# Patient Record
Sex: MALE | Race: WHITE | Employment: OTHER | URBAN - METROPOLITAN AREA
[De-identification: names, ages, dates, MRNs, and addresses within clinical notes are randomized per-mention and may not be internally consistent; named-entity substitution may affect disease eponyms.]

---

## 2017-02-28 ENCOUNTER — APPOINTMENT (OUTPATIENT)
Dept: GENERAL RADIOLOGY | Facility: HOSPITAL | Age: 82
DRG: 194 | End: 2017-02-28
Attending: EMERGENCY MEDICINE
Payer: MEDICARE

## 2017-02-28 ENCOUNTER — HOSPITAL ENCOUNTER (INPATIENT)
Facility: HOSPITAL | Age: 82
LOS: 3 days | Discharge: HOME HEALTH | DRG: 194 | End: 2017-03-03
Attending: EMERGENCY MEDICINE | Admitting: HOSPITALIST
Payer: MEDICARE

## 2017-02-28 DIAGNOSIS — R11.2 NAUSEA VOMITING AND DIARRHEA: ICD-10-CM

## 2017-02-28 DIAGNOSIS — J18.9 COMMUNITY ACQUIRED PNEUMONIA: Primary | ICD-10-CM

## 2017-02-28 DIAGNOSIS — E11.9 TYPE 2 DIABETES MELLITUS WITHOUT COMPLICATION, WITHOUT LONG-TERM CURRENT USE OF INSULIN (HCC): ICD-10-CM

## 2017-02-28 DIAGNOSIS — R19.7 NAUSEA VOMITING AND DIARRHEA: ICD-10-CM

## 2017-02-28 DIAGNOSIS — E87.1 HYPONATREMIA: ICD-10-CM

## 2017-02-28 DIAGNOSIS — E86.0 DEHYDRATION: ICD-10-CM

## 2017-02-28 LAB
ALBUMIN SERPL-MCNC: 3 G/DL (ref 3.5–4.8)
ALP LIVER SERPL-CCNC: 75 U/L (ref 45–117)
ALT SERPL-CCNC: 41 U/L (ref 17–63)
AST SERPL-CCNC: 32 U/L (ref 15–41)
BASOPHILS # BLD AUTO: 0.04 X10(3) UL (ref 0–0.1)
BASOPHILS NFR BLD AUTO: 0.4 %
BILIRUB SERPL-MCNC: 1.5 MG/DL (ref 0.1–2)
BILIRUB UR QL STRIP.AUTO: NEGATIVE
BUN BLD-MCNC: 17 MG/DL (ref 8–20)
CALCIUM BLD-MCNC: 9.1 MG/DL (ref 8.3–10.3)
CHLORIDE: 99 MMOL/L (ref 101–111)
CO2: 23 MMOL/L (ref 22–32)
CREAT BLD-MCNC: 1.13 MG/DL (ref 0.7–1.3)
EOSINOPHIL # BLD AUTO: 0.01 X10(3) UL (ref 0–0.3)
EOSINOPHIL NFR BLD AUTO: 0.1 %
ERYTHROCYTE [DISTWIDTH] IN BLOOD BY AUTOMATED COUNT: 13.4 % (ref 11.5–16)
EST. AVERAGE GLUCOSE BLD GHB EST-MCNC: 123 MG/DL (ref 68–126)
GLUCOSE BLD-MCNC: 127 MG/DL (ref 65–99)
GLUCOSE BLD-MCNC: 136 MG/DL (ref 65–99)
GLUCOSE BLD-MCNC: 153 MG/DL (ref 70–99)
GLUCOSE BLD-MCNC: 180 MG/DL (ref 65–99)
GLUCOSE UR STRIP.AUTO-MCNC: NEGATIVE MG/DL
HBA1C MFR BLD HPLC: 5.9 % (ref ?–5.7)
HCT VFR BLD AUTO: 41.7 % (ref 37–53)
HGB BLD-MCNC: 14.4 G/DL (ref 13–17)
IMMATURE GRANULOCYTE COUNT: 0.11 X10(3) UL (ref 0–1)
IMMATURE GRANULOCYTE RATIO %: 1 %
KETONES UR STRIP.AUTO-MCNC: 20 MG/DL
LEUKOCYTE ESTERASE UR QL STRIP.AUTO: NEGATIVE
LIPASE: 111 U/L (ref 73–393)
LYMPHOCYTES # BLD AUTO: 0.4 X10(3) UL (ref 0.9–4)
LYMPHOCYTES NFR BLD AUTO: 3.8 %
M PROTEIN MFR SERPL ELPH: 7.3 G/DL (ref 6.1–8.3)
MCH RBC QN AUTO: 30.8 PG (ref 27–33.2)
MCHC RBC AUTO-ENTMCNC: 34.5 G/DL (ref 31–37)
MCV RBC AUTO: 89.3 FL (ref 80–99)
MONOCYTES # BLD AUTO: 0.87 X10(3) UL (ref 0.1–0.6)
MONOCYTES NFR BLD AUTO: 8.2 %
NEUTROPHIL ABS PRELIM: 9.13 X10 (3) UL (ref 1.3–6.7)
NEUTROPHILS # BLD AUTO: 9.13 X10(3) UL (ref 1.3–6.7)
NEUTROPHILS NFR BLD AUTO: 86.5 %
NITRITE UR QL STRIP.AUTO: NEGATIVE
PH UR STRIP.AUTO: 5 [PH] (ref 4.5–8)
PLATELET # BLD AUTO: 118 10(3)UL (ref 150–450)
POTASSIUM SERPL-SCNC: 3.7 MMOL/L (ref 3.6–5.1)
PROCALCITONIN SERPL-MCNC: 0.3 NG/ML (ref ?–0.11)
PROT UR STRIP.AUTO-MCNC: 100 MG/DL
RBC # BLD AUTO: 4.67 X10(6)UL (ref 3.8–5.8)
RBC UR QL AUTO: NEGATIVE
RED CELL DISTRIBUTION WIDTH-SD: 43.9 FL (ref 35.1–46.3)
RESPIRATORY PANEL NEG:: NEGATIVE
SODIUM SERPL-SCNC: 133 MMOL/L (ref 136–144)
SP GR UR STRIP.AUTO: 1.03 (ref 1–1.03)
UROBILINOGEN UR STRIP.AUTO-MCNC: <2 MG/DL
WBC # BLD AUTO: 10.6 X10(3) UL (ref 4–13)

## 2017-02-28 PROCEDURE — 99223 1ST HOSP IP/OBS HIGH 75: CPT | Performed by: HOSPITALIST

## 2017-02-28 PROCEDURE — 71010 XR CHEST AP PORTABLE  (CPT=71010): CPT

## 2017-02-28 RX ORDER — ONDANSETRON 2 MG/ML
4 INJECTION INTRAMUSCULAR; INTRAVENOUS ONCE
Status: COMPLETED | OUTPATIENT
Start: 2017-02-28 | End: 2017-02-28

## 2017-02-28 RX ORDER — ONDANSETRON 2 MG/ML
4 INJECTION INTRAMUSCULAR; INTRAVENOUS EVERY 4 HOURS PRN
Status: DISCONTINUED | OUTPATIENT
Start: 2017-02-28 | End: 2017-03-03

## 2017-02-28 RX ORDER — SODIUM CHLORIDE 9 MG/ML
INJECTION, SOLUTION INTRAVENOUS CONTINUOUS
Status: ACTIVE | OUTPATIENT
Start: 2017-02-28 | End: 2017-02-28

## 2017-02-28 RX ORDER — DEXTROSE MONOHYDRATE 25 G/50ML
50 INJECTION, SOLUTION INTRAVENOUS
Status: DISCONTINUED | OUTPATIENT
Start: 2017-02-28 | End: 2017-03-03

## 2017-02-28 RX ORDER — ENOXAPARIN SODIUM 100 MG/ML
40 INJECTION SUBCUTANEOUS DAILY
Status: DISCONTINUED | OUTPATIENT
Start: 2017-02-28 | End: 2017-02-28 | Stop reason: DRUGHIGH

## 2017-02-28 RX ORDER — SODIUM CHLORIDE 9 MG/ML
INJECTION, SOLUTION INTRAVENOUS ONCE
Status: COMPLETED | OUTPATIENT
Start: 2017-02-28 | End: 2017-02-28

## 2017-02-28 RX ORDER — ENOXAPARIN SODIUM 100 MG/ML
40 INJECTION SUBCUTANEOUS DAILY
Status: DISCONTINUED | OUTPATIENT
Start: 2017-02-28 | End: 2017-03-03

## 2017-02-28 RX ORDER — VIT A/VIT C/VIT E/ZINC/COPPER 2148-113
1 TABLET ORAL 2 TIMES DAILY
COMMUNITY

## 2017-02-28 RX ORDER — DOXEPIN HYDROCHLORIDE 50 MG/1
1 CAPSULE ORAL DAILY
COMMUNITY
End: 2020-10-29 | Stop reason: ALTCHOICE

## 2017-02-28 RX ORDER — ACETAMINOPHEN 325 MG/1
650 TABLET ORAL EVERY 6 HOURS PRN
Status: DISCONTINUED | OUTPATIENT
Start: 2017-02-28 | End: 2017-03-03

## 2017-02-28 RX ORDER — POTASSIUM CHLORIDE 20 MEQ/1
40 TABLET, EXTENDED RELEASE ORAL ONCE
Status: COMPLETED | OUTPATIENT
Start: 2017-02-28 | End: 2017-02-28

## 2017-02-28 RX ORDER — GABAPENTIN 400 MG/1
400 CAPSULE ORAL 2 TIMES DAILY
Status: DISCONTINUED | OUTPATIENT
Start: 2017-02-28 | End: 2017-03-03

## 2017-02-28 NOTE — ED INITIAL ASSESSMENT (HPI)
Pt arrived from [facility], called for vomiting/diarrhea for past couple days. +weakness.

## 2017-02-28 NOTE — H&P
АЛЕКСАНДР HOSPITALIST  History and Physical     Autumn Woodward Patient Status:  Inpatient    1933 MRN [id number]   [facility] 5NW-A Attending Moses Nova MD   Hosp Day # 0 PCP TITA HAYES [address] [place] [address]     Chief Complaint: Pneumonia    H Release Take 2 capsules by mouth. Disp:  Rfl:    MethylPREDNISolone 4 MG Oral Tablet Therapy Pack Take as per packet instructions Disp: 1 Package Rfl: 0   Fluticasone Propionate 50 MCG/ACT Nasal Suspension 2 sprays by Each Nare route daily.  Disp: 1 Bottle the last 72 hours. No results for input(s): TROP, CK in the last 72 hours. Imaging: Imaging data reviewed in Epic. ASSESSMENT / PLAN:     1. Community acquired pneumonia:  IV abx, follow cultures. 2. Diarrhea:  Check stool for c.  Diff, culture

## 2017-02-28 NOTE — ED PROVIDER NOTES
Patient Seen in: [facility] Emergency Department    History   Patient presents with:  Nausea/Vomiting/Diarrhea (gastrointestinal)    Stated Complaint: nvd    HPI    27-year-old male with a history of type 2 diabetes, history of hypercholesterolemia, elements reviewed from today and agreed except as otherwise stated in HPI.     Physical Exam       ED Triage Vitals   BP 02/28/17 0701 138/87 mmHg   Pulse 02/28/17 0701 87   Resp 02/28/17 0701 18   Temp 02/28/17 0701 99.8 °F (37.7 °C)   Temp src 02/28/17 07 9.13 (*)     Lymphocyte Absolute 0.40 (*)     Monocyte Absolute 0.87 (*)     All other components within normal limits   LIPASE - Normal   CBC WITH DIFFERENTIAL WITH PLATELET    Narrative:      The following orders were created for panel order CBC WITH DIFF department. Patient remained stable throughout the emergency department observation period. Patient was started on community-acquired coverage for pneumonia.   Although his numbers do not show acute renal failure, clinically patient appears dehydrated ove

## 2017-03-01 LAB
GLUCOSE BLD-MCNC: 111 MG/DL (ref 65–99)
GLUCOSE BLD-MCNC: 115 MG/DL (ref 65–99)
GLUCOSE BLD-MCNC: 123 MG/DL (ref 65–99)
GLUCOSE BLD-MCNC: 130 MG/DL (ref 65–99)
POTASSIUM SERPL-SCNC: 3.7 MMOL/L (ref 3.6–5.1)

## 2017-03-01 PROCEDURE — 99232 SBSQ HOSP IP/OBS MODERATE 35: CPT | Performed by: HOSPITALIST

## 2017-03-01 RX ORDER — POTASSIUM CHLORIDE 20 MEQ/1
40 TABLET, EXTENDED RELEASE ORAL ONCE
Status: COMPLETED | OUTPATIENT
Start: 2017-03-01 | End: 2017-03-01

## 2017-03-01 NOTE — HOME CARE LIAISON
Received referral for [facility] on d/c for SN/PT. Met with patient who is agreeable to [facility] on d/c. Agency brochure given to patient. Referral sent to [facility] via [facility]    Thank you for this referral,   Oswaldo Hopkins

## 2017-03-01 NOTE — PLAN OF CARE
Altered Communication/Language Barrier    • Patient/Family is able to understand and participate in their care Progressing        DISCHARGE PLANNING    • Discharge to home or other facility with appropriate resources Progressing        Diabetes/Glucose Con

## 2017-03-01 NOTE — PHYSICAL THERAPY NOTE
PHYSICAL THERAPY EVALUATION - INPATIENT     Room Number: 410/818-C  Evaluation Date: 3/1/2017  Type of Evaluation: Initial  Physician Order: PT Eval and Treat    Presenting Problem: Community acquired pneumonia  Reason for Therapy: Mobility Dysfunction little tight,\" she expresses concerns about his mobility within the living space. SUBJECTIVE  \"I normally walk faster than this. \"    Patient self-stated goal is go home.      OBJECTIVE  Precautions: Low vision  Fall Risk: Standard fall risk    WEIGHT Rolling walker  Pattern: Shuffle  Stoop/Curb Assistance: Not tested       Skilled Therapy Provided: Patient was met sitting up in bed, agreeable to PT.  Patient performed supine to sit transfer independently, performed sit to stand transfer with CGA after a mobility concerns within the home. DISCHARGE RECOMMENDATIONS  PT Discharge Recommendations: Home with home health PT    PLAN  PT Treatment Plan: Bed mobility; Body mechanics; Endurance; Energy conservation;Patient education; Family education;Gait training;

## 2017-03-01 NOTE — CM/SW NOTE
03/01/17 1600   CM/SW Referral Data   Referral Source Physician   Reason for Referral Discharge planning;Psychoscial assessment   Informant Patient   Pertinent Medical Hx   Primary Care Physician Name Byronbeau   Patient Info   Patient's Mental Status Aler

## 2017-03-01 NOTE — PROGRESS NOTES
АЛЕКСАНДР HOSPITALIST  Progress Note     Arpan Parson Patient Status:  Inpatient    1933 MRN [id number]   [facility] 5NW-A Attending Rachel Swenson MD   Hosp Day # 1 PCP TITA IBARRA     Chief Complaint: Dyspnea    S: Patient f eval    Quality:  · DVT Prophylaxis: Lovenox  · CODE status: Full Code  · [place]: None  · Central line: None    Estimated date of discharge: TBD  Discharge is dependent on: Progress  At this point Mr. Amilcar Dixon is expected to be discharge to: Home    Plan of ca

## 2017-03-02 ENCOUNTER — APPOINTMENT (OUTPATIENT)
Dept: CV DIAGNOSTICS | Facility: HOSPITAL | Age: 82
DRG: 194 | End: 2017-03-02
Attending: NURSE PRACTITIONER
Payer: MEDICARE

## 2017-03-02 ENCOUNTER — APPOINTMENT (OUTPATIENT)
Dept: GENERAL RADIOLOGY | Facility: HOSPITAL | Age: 82
DRG: 194 | End: 2017-03-02
Attending: INTERNAL MEDICINE
Payer: MEDICARE

## 2017-03-02 LAB
ATRIAL RATE: 69 BPM
BASOPHILS # BLD AUTO: 0.03 X10(3) UL (ref 0–0.1)
BASOPHILS NFR BLD AUTO: 0.5 %
BETA NATRIURETIC PEPTIDE: 32 PG/ML (ref 2–99)
BUN BLD-MCNC: 19 MG/DL (ref 8–20)
CALCIUM BLD-MCNC: 8.6 MG/DL (ref 8.3–10.3)
CHLORIDE: 101 MMOL/L (ref 101–111)
CO2: 26 MMOL/L (ref 22–32)
CREAT BLD-MCNC: 0.99 MG/DL (ref 0.7–1.3)
EOSINOPHIL # BLD AUTO: 0.2 X10(3) UL (ref 0–0.3)
EOSINOPHIL NFR BLD AUTO: 3.1 %
ERYTHROCYTE [DISTWIDTH] IN BLOOD BY AUTOMATED COUNT: 13.8 % (ref 11.5–16)
GLUCOSE BLD-MCNC: 115 MG/DL (ref 70–99)
GLUCOSE BLD-MCNC: 129 MG/DL (ref 65–99)
GLUCOSE BLD-MCNC: 142 MG/DL (ref 65–99)
GLUCOSE BLD-MCNC: 143 MG/DL (ref 65–99)
GLUCOSE BLD-MCNC: 164 MG/DL (ref 65–99)
HCT VFR BLD AUTO: 39.3 % (ref 37–53)
HGB BLD-MCNC: 13.3 G/DL (ref 13–17)
IMMATURE GRANULOCYTE COUNT: 0.25 X10(3) UL (ref 0–1)
IMMATURE GRANULOCYTE RATIO %: 3.9 %
LYMPHOCYTES # BLD AUTO: 0.58 X10(3) UL (ref 0.9–4)
LYMPHOCYTES NFR BLD AUTO: 9 %
MCH RBC QN AUTO: 30.8 PG (ref 27–33.2)
MCHC RBC AUTO-ENTMCNC: 33.8 G/DL (ref 31–37)
MCV RBC AUTO: 91 FL (ref 80–99)
MONOCYTES # BLD AUTO: 0.78 X10(3) UL (ref 0.1–0.6)
MONOCYTES NFR BLD AUTO: 12.1 %
NEUTROPHIL ABS PRELIM: 4.58 X10 (3) UL (ref 1.3–6.7)
NEUTROPHILS # BLD AUTO: 4.58 X10(3) UL (ref 1.3–6.7)
NEUTROPHILS NFR BLD AUTO: 71.4 %
P AXIS: 0 DEGREES
P-R INTERVAL: 176 MS
PLATELET # BLD AUTO: 131 10(3)UL (ref 150–450)
POTASSIUM SERPL-SCNC: 3.8 MMOL/L (ref 3.6–5.1)
Q-T INTERVAL: 384 MS
QRS DURATION: 74 MS
QTC CALCULATION (BEZET): 411 MS
R AXIS: -29 DEGREES
RBC # BLD AUTO: 4.32 X10(6)UL (ref 3.8–5.8)
RED CELL DISTRIBUTION WIDTH-SD: 46.5 FL (ref 35.1–46.3)
SODIUM SERPL-SCNC: 135 MMOL/L (ref 136–144)
T AXIS: 0 DEGREES
TROPONIN: <0.046 NG/ML (ref ?–0.05)
TROPONIN: <0.046 NG/ML (ref ?–0.05)
VENTRICULAR RATE: 69 BPM
WBC # BLD AUTO: 6.4 X10(3) UL (ref 4–13)

## 2017-03-02 PROCEDURE — 71020 XR CHEST PA + LAT CHEST (CPT=71020): CPT

## 2017-03-02 PROCEDURE — 99232 SBSQ HOSP IP/OBS MODERATE 35: CPT | Performed by: INTERNAL MEDICINE

## 2017-03-02 RX ORDER — GABAPENTIN 100 MG/1
100 CAPSULE ORAL NIGHTLY
COMMUNITY

## 2017-03-02 RX ORDER — POTASSIUM CHLORIDE 20 MEQ/1
40 TABLET, EXTENDED RELEASE ORAL ONCE
Status: COMPLETED | OUTPATIENT
Start: 2017-03-02 | End: 2017-03-02

## 2017-03-02 RX ORDER — AMOXICILLIN AND CLAVULANATE POTASSIUM 875; 125 MG/1; MG/1
1 TABLET, FILM COATED ORAL 2 TIMES DAILY
Qty: 14 TABLET | Refills: 0 | Status: SHIPPED | OUTPATIENT
Start: 2017-03-02 | End: 2017-03-03

## 2017-03-02 NOTE — CM/SW NOTE
Spoke with pt's RN who stated pt's dtr is requesting to speak with SW. Spoke with pt's dtr, Lela Malin ([phone]) who stated that pt's wife is current with [facility] ([phone]). She requested that pt use the same [facility] agency at [place].   Pt's dtr also

## 2017-03-02 NOTE — PROGRESS NOTES
АЛЕКСАНДР HOSPITALIST  Progress Note     Neal Gonzalez Patient Status:  Inpatient    1933 MRN [id number]   [facility] 5NW-A Attending Randall Aschoff, MD   Hosp Day # 2 PCP TITA IBARRA     Chief Complaint: Dyspnea    S: Patient f AST  32   --    --    ALT  41   --    --    BILT  1.5   --    --    TP  7.3   --    --        Estimated Creatinine Clearance: 58.4 mL/min (based on Cr of 0.99).          Imaging:   XR CHEST AP PORTABLE (CPT=71010)      TECHNIQUE:  AP chest radiograph was [facility]    Plan of care discussed with Patient.   Discussed with patient's daughter over phone  Karlos Dodd Daughter at  [phone]       Advise follow-up with regular primary care physician TITA HAYES [facility] within 1 week in office and patient jeff

## 2017-03-02 NOTE — PLAN OF CARE
GASTROINTESTINAL - ADULT    • Minimal or absence of nausea and vomiting Progressing    • Maintains or returns to baseline bowel function Progressing        Patient/Family Goals    • Patient/Family Long Term Goal Progressing    • Patient/Family Short Term G

## 2017-03-02 NOTE — PHYSICAL THERAPY NOTE
PHYSICAL THERAPY TREATMENT NOTE - INPATIENT    Room Number: 697/552-M     Session: 1   Number of Visits to Meet Established Goals: 5    Presenting Problem: Community acquired pneumonia    Problem List  Principal Problem:    Community acquired pneumonia  A wheelchair)?: A Little   -   Need to walk in hospital room?: A Little   -   Climbing 3-5 steps with a railing?: A Lot     AM-PAC Score:  Raw Score: 19   PT Approx Degree of Impairment Score: 41.77%   Standardized Score (AM-PAC Scale): 45.44   CMS Modifier address the above deficits to assist patient in returning to prior level of function. Patient would benefit from home health PT to address his reduced activity tolerance and mobility concerns within the home.      DISCHARGE RECOMMENDATIONS  PT Discharge Rec

## 2017-03-02 NOTE — CONSULTS
[facility] Cardiology Consultation 3/2/2017      Justyna Perez Patient Status:  Inpatient    1933 MRN [id number]   [facility] 5NW-A Attending Scarlet Mazariegos MD   Hosp Day # 2 PCP Kinsey Fisher     Initial Impression: file. He reports that he does not drink alcohol or use illicit drugs.     Medications:  • cefTRIAXone  2 g Intravenous Q24H   • insulin aspart  1-50 Units Subcutaneous TID CC and HS   • enoxaparin  40 mg Subcutaneous Daily   • gabapentin  400 mg Oral BID --   19   CREATSERUM  1.13   --   0.99   CA  9.1   --   8.6   GLU  153*   --   115*       Recent Labs   Lab  02/28/17   0703   ALT  41   AST  32   ALB  3.0*       No results for input(s): PTP, INR in the last 72 hours.     Recent Labs   Lab  03/02/17   05

## 2017-03-03 ENCOUNTER — APPOINTMENT (OUTPATIENT)
Dept: CV DIAGNOSTICS | Facility: HOSPITAL | Age: 82
DRG: 194 | End: 2017-03-03
Attending: NURSE PRACTITIONER
Payer: MEDICARE

## 2017-03-03 VITALS
HEIGHT: 70 IN | SYSTOLIC BLOOD PRESSURE: 107 MMHG | BODY MASS INDEX: 36.84 KG/M2 | HEART RATE: 67 BPM | WEIGHT: 257.31 LBS | RESPIRATION RATE: 16 BRPM | DIASTOLIC BLOOD PRESSURE: 70 MMHG | OXYGEN SATURATION: 92 % | TEMPERATURE: 98 F

## 2017-03-03 LAB
GLUCOSE BLD-MCNC: 110 MG/DL (ref 65–99)
GLUCOSE BLD-MCNC: 119 MG/DL (ref 65–99)
POTASSIUM SERPL-SCNC: 4.2 MMOL/L (ref 3.6–5.1)

## 2017-03-03 PROCEDURE — 93306 TTE W/DOPPLER COMPLETE: CPT | Performed by: INTERNAL MEDICINE

## 2017-03-03 PROCEDURE — 99238 HOSP IP/OBS DSCHRG MGMT 30/<: CPT | Performed by: INTERNAL MEDICINE

## 2017-03-03 PROCEDURE — 93306 TTE W/DOPPLER COMPLETE: CPT

## 2017-03-03 RX ORDER — AMOXICILLIN AND CLAVULANATE POTASSIUM 875; 125 MG/1; MG/1
1 TABLET, FILM COATED ORAL 2 TIMES DAILY
Qty: 14 TABLET | Refills: 0 | Status: SHIPPED | OUTPATIENT
Start: 2017-03-03 | End: 2017-03-10

## 2017-03-03 NOTE — DISCHARGE SUMMARY
[facility]  Discharge Summary    Autumn Woodward Patient Status:  Inpatient    1933 MRN [id number]   [facility] 5NW-A Attending No att. providers found   Hosp Day # 3 PCP TITA IBARRA     Date of Admission: 2017    Da clinically and discharged in stable condition    Procedures during hospitalization:   • none    Incidental or significant findings and recommendations (brief descriptions):  • none    Lab/Test results pending at Discharge:   · none      Discharge Medicatio known as:  Davon Flores                Where to Get Your Medications      These medications were sent to [facility] [address], [address], [phone], [phone]  [facility], [place] [address]

## 2017-03-03 NOTE — PROGRESS NOTES
NURSING DISCHARGE NOTE    Discharged Home via Wheelchair. Accompanied by Family member and Support staff  Belongings Taken by patient/family.     Discharge instructions reviewed with patient and daughter, instructed on new medication (Augmentin), instr

## 2017-03-03 NOTE — PROGRESS NOTES
АЛЕКСАНДР HOSPITALIST  Progress Note     Avelino Norman Patient Status:  Inpatient    1933 MRN [id number]   [facility] 5NW-A Attending Leticia Hurley MD   Hosp Day # 3 PCP TITA IBARRA     Chief Complaint: Dyspnea    S: Patient f CHEST AP PORTABLE (CPT=71010)      TECHNIQUE:  AP chest radiograph was obtained.      COMPARISON:  None.      INDICATIONS:  nvd      PATIENT STATED HISTORY:  Patient shares he lives in a senior home and has had vomiting and diarrhea for 4 days.           = regular primary care physician TITA Rivera within 1 week in office and patient verbalized understanding    Debra Marcial MD  3/3/2017

## 2017-03-03 NOTE — PLAN OF CARE
Altered Communication/Language Barrier    • Patient/Family is able to understand and participate in their care Completed        DISCHARGE PLANNING    • Discharge to home or other facility with appropriate resources Completed        Diabetes/Glucose Control

## 2017-03-06 NOTE — CM/SW NOTE
Patient discharged on 03/03/17 as previously planned.         03/06/17 0900   Discharge disposition   Discharged to: Home-Health   Name of Facillity/Home Care/Hospice ([facility])   Home services after discharge Skilled home care   Discharge transportat

## 2017-07-05 ENCOUNTER — APPOINTMENT (OUTPATIENT)
Dept: GENERAL RADIOLOGY | Facility: HOSPITAL | Age: 82
End: 2017-07-05
Attending: EMERGENCY MEDICINE
Payer: MEDICARE

## 2017-07-05 ENCOUNTER — HOSPITAL ENCOUNTER (EMERGENCY)
Facility: HOSPITAL | Age: 82
Discharge: HOME | End: 2017-07-05
Attending: EMERGENCY MEDICINE
Payer: MEDICARE

## 2017-07-05 VITALS
SYSTOLIC BLOOD PRESSURE: 122 MMHG | DIASTOLIC BLOOD PRESSURE: 58 MMHG | HEART RATE: 56 BPM | OXYGEN SATURATION: 96 % | RESPIRATION RATE: 16 BRPM | BODY MASS INDEX: 38.65 KG/M2 | HEIGHT: 70 IN | TEMPERATURE: 98 F | WEIGHT: 270 LBS

## 2017-07-05 DIAGNOSIS — K62.5 RECTAL BLEEDING: Primary | ICD-10-CM

## 2017-07-05 LAB
BASOPHILS # BLD AUTO: 0.07 X10(3) UL (ref 0–0.1)
BASOPHILS NFR BLD AUTO: 1.3 %
EOSINOPHIL # BLD AUTO: 0.28 X10(3) UL (ref 0–0.3)
EOSINOPHIL NFR BLD AUTO: 5.1 %
ERYTHROCYTE [DISTWIDTH] IN BLOOD BY AUTOMATED COUNT: 13.4 % (ref 11.5–16)
HCT VFR BLD AUTO: 46.6 % (ref 37–53)
HGB BLD-MCNC: 15.2 G/DL (ref 13–17)
IMMATURE GRANULOCYTE COUNT: 0.07 X10(3) UL (ref 0–1)
IMMATURE GRANULOCYTE RATIO %: 1.3 %
LYMPHOCYTES # BLD AUTO: 1.51 X10(3) UL (ref 0.9–4)
LYMPHOCYTES NFR BLD AUTO: 27.3 %
MCH RBC QN AUTO: 30 PG (ref 27–33.2)
MCHC RBC AUTO-ENTMCNC: 32.6 G/DL (ref 31–37)
MCV RBC AUTO: 92.1 FL (ref 80–99)
MONOCYTES # BLD AUTO: 0.41 X10(3) UL (ref 0.1–0.6)
MONOCYTES NFR BLD AUTO: 7.4 %
NEUTROPHIL ABS PRELIM: 3.19 X10 (3) UL (ref 1.3–6.7)
NEUTROPHILS # BLD AUTO: 3.19 X10(3) UL (ref 1.3–6.7)
NEUTROPHILS NFR BLD AUTO: 57.6 %
PLATELET # BLD AUTO: 130 10(3)UL (ref 150–450)
RBC # BLD AUTO: 5.06 X10(6)UL (ref 3.8–5.8)
RED CELL DISTRIBUTION WIDTH-SD: 45.3 FL (ref 35.1–46.3)
WBC # BLD AUTO: 5.5 X10(3) UL (ref 4–13)

## 2017-07-05 PROCEDURE — 99283 EMERGENCY DEPT VISIT LOW MDM: CPT

## 2017-07-05 PROCEDURE — 99284 EMERGENCY DEPT VISIT MOD MDM: CPT

## 2017-07-05 PROCEDURE — 36415 COLL VENOUS BLD VENIPUNCTURE: CPT

## 2017-07-05 PROCEDURE — 74000 XR ABDOMEN (KUB) (1 AP VIEW)  (CPT=74000): CPT | Performed by: EMERGENCY MEDICINE

## 2017-07-05 PROCEDURE — 82272 OCCULT BLD FECES 1-3 TESTS: CPT

## 2017-07-05 PROCEDURE — 85025 COMPLETE CBC W/AUTO DIFF WBC: CPT

## 2017-07-05 NOTE — ED PROVIDER NOTES
Patient Seen in: [facility] Emergency Department    History   Patient presents with:  Abdomen/Flank Pain (GI/)  GI Bleeding (gastrointestinal)    Stated Complaint: abd pain; gi bleed    HPI    70-year-old male presents with bright red blood per re Release,  Take 2 capsules by mouth. No family history on file. Smoking status: Former Smoker                                                              Packs/day: 0.00      Years: 0.00         Quit date: 1/1/1986  Alcohol use:  No PLATELET.   Procedure                               Abnormality         Status                     ---------                               -----------         ------                     CBC W/ DIFFERENTIAL[[id number]]          Abnormal            Final resul will return with worsening bleeding, weakness, or with any concerns. X-ray shows no evidence of retained metallic foreign body.         Disposition and Plan     Clinical Impression:  Rectal bleeding  (primary encounter diagnosis)    Disposition:  Discharge

## 2017-07-05 NOTE — ED INITIAL ASSESSMENT (HPI)
Pt believes he swallowed a metal part of his partial dentures about 2 weeks ago. Rectal bleeding, bright red, that started yesterday.

## 2017-10-01 ENCOUNTER — APPOINTMENT (OUTPATIENT)
Dept: CT IMAGING | Facility: HOSPITAL | Age: 82
End: 2017-10-01
Attending: EMERGENCY MEDICINE
Payer: MEDICARE

## 2017-10-01 ENCOUNTER — HOSPITAL ENCOUNTER (EMERGENCY)
Facility: HOSPITAL | Age: 82
Discharge: HOME | End: 2017-10-01
Attending: EMERGENCY MEDICINE
Payer: MEDICARE

## 2017-10-01 VITALS
RESPIRATION RATE: 19 BRPM | HEIGHT: 71 IN | DIASTOLIC BLOOD PRESSURE: 72 MMHG | OXYGEN SATURATION: 98 % | BODY MASS INDEX: 37.38 KG/M2 | TEMPERATURE: 98 F | SYSTOLIC BLOOD PRESSURE: 138 MMHG | HEART RATE: 59 BPM | WEIGHT: 267 LBS

## 2017-10-01 DIAGNOSIS — R11.2 NAUSEA AND VOMITING IN ADULT: ICD-10-CM

## 2017-10-01 DIAGNOSIS — R42 VERTIGO: Primary | ICD-10-CM

## 2017-10-01 PROCEDURE — 99285 EMERGENCY DEPT VISIT HI MDM: CPT

## 2017-10-01 PROCEDURE — 93005 ELECTROCARDIOGRAM TRACING: CPT

## 2017-10-01 PROCEDURE — 84484 ASSAY OF TROPONIN QUANT: CPT | Performed by: EMERGENCY MEDICINE

## 2017-10-01 PROCEDURE — 93010 ELECTROCARDIOGRAM REPORT: CPT

## 2017-10-01 PROCEDURE — 96374 THER/PROPH/DIAG INJ IV PUSH: CPT

## 2017-10-01 PROCEDURE — 85025 COMPLETE CBC W/AUTO DIFF WBC: CPT | Performed by: EMERGENCY MEDICINE

## 2017-10-01 PROCEDURE — 80053 COMPREHEN METABOLIC PANEL: CPT | Performed by: EMERGENCY MEDICINE

## 2017-10-01 PROCEDURE — 70450 CT HEAD/BRAIN W/O DYE: CPT | Performed by: EMERGENCY MEDICINE

## 2017-10-01 PROCEDURE — 96361 HYDRATE IV INFUSION ADD-ON: CPT

## 2017-10-01 RX ORDER — MECLIZINE HYDROCHLORIDE 25 MG/1
25 TABLET ORAL 3 TIMES DAILY PRN
Qty: 20 TABLET | Refills: 0 | Status: SHIPPED | OUTPATIENT
Start: 2017-10-01 | End: 2020-10-29 | Stop reason: ALTCHOICE

## 2017-10-01 RX ORDER — SODIUM CHLORIDE 9 MG/ML
125 INJECTION, SOLUTION INTRAVENOUS CONTINUOUS
Status: DISCONTINUED | OUTPATIENT
Start: 2017-10-01 | End: 2017-10-01

## 2017-10-01 RX ORDER — ONDANSETRON 4 MG/1
4 TABLET, ORALLY DISINTEGRATING ORAL EVERY 4 HOURS PRN
Qty: 10 TABLET | Refills: 0 | Status: SHIPPED | OUTPATIENT
Start: 2017-10-01 | End: 2017-10-08

## 2017-10-01 RX ORDER — MECLIZINE HYDROCHLORIDE 25 MG/1
25 TABLET ORAL ONCE
Status: COMPLETED | OUTPATIENT
Start: 2017-10-01 | End: 2017-10-01

## 2017-10-01 RX ORDER — ONDANSETRON 2 MG/ML
4 INJECTION INTRAMUSCULAR; INTRAVENOUS ONCE
Status: COMPLETED | OUTPATIENT
Start: 2017-10-01 | End: 2017-10-01

## 2017-10-01 NOTE — ED INITIAL ASSESSMENT (HPI)
Pt here via ems with nausea and vomiting. Pt stating he ate cabbage rolls at lunch time and felt ok. Vomited x 8 times . Denies abdominal pain. Feels weak with dizziness.

## 2017-10-01 NOTE — ED PROVIDER NOTES
Patient Seen in: [facility] Emergency Department    History   Patient presents with:  Nausea/Vomiting/Diarrhea (gastrointestinal)    Stated Complaint: Nausea vomiting    HPI    45-year-old male with a history of prostate and skin cancer, diabetes, h as otherwise stated in HPI.     Physical Exam   ED Triage Vitals [10/01/17 7594]  BP: 142/77  Pulse: 78  Resp: 14  Temp: 97.6 °F (36.4 °C)  Temp src: Temporal  SpO2: 96 %  O2 Device: None (Room air)    Current:/84   Pulse 58   Temp 97.6 °F (36.4 °C) ( ---------                               -----------         ------                     CBC W/ DIFFERENTIAL[[id number]]          Abnormal            Final result                 Please view results for these tests on the individual orders.    REYES GODFREY drawn.    The patient was administered normal saline infusion, Zofran medications for the purposes of treating  [nausea, vomiting, possible dehydration]. The patient had good response to these medications.     Patient continued to be observed here in the e

## 2017-10-01 NOTE — ED NOTES
Pt refuses antivert stating he no longer feels dizzy. Pt stating I feel better and I dosed off for a bit.  Family at bs

## 2018-01-12 ENCOUNTER — HOSPITAL ENCOUNTER (OUTPATIENT)
Facility: HOSPITAL | Age: 83
Setting detail: OBSERVATION
Discharge: HOME | End: 2018-01-13
Attending: EMERGENCY MEDICINE | Admitting: HOSPITALIST
Payer: MEDICARE

## 2018-01-12 ENCOUNTER — APPOINTMENT (OUTPATIENT)
Dept: GENERAL RADIOLOGY | Facility: HOSPITAL | Age: 83
End: 2018-01-12
Attending: EMERGENCY MEDICINE
Payer: MEDICARE

## 2018-01-12 DIAGNOSIS — R41.0 CONFUSION: Primary | ICD-10-CM

## 2018-01-12 LAB
ALBUMIN SERPL-MCNC: 3.5 G/DL (ref 3.5–4.8)
ALP LIVER SERPL-CCNC: 62 U/L (ref 45–117)
ALT SERPL-CCNC: 33 U/L (ref 17–63)
AST SERPL-CCNC: 26 U/L (ref 15–41)
BASOPHILS # BLD AUTO: 0.02 X10(3) UL (ref 0–0.1)
BASOPHILS NFR BLD AUTO: 0.4 %
BILIRUB SERPL-MCNC: 0.9 MG/DL (ref 0.1–2)
BUN BLD-MCNC: 13 MG/DL (ref 8–20)
CALCIUM BLD-MCNC: 8.9 MG/DL (ref 8.3–10.3)
CHLORIDE: 107 MMOL/L (ref 101–111)
CO2: 26 MMOL/L (ref 22–32)
CREAT BLD-MCNC: 1.11 MG/DL (ref 0.7–1.3)
EOSINOPHIL # BLD AUTO: 0.04 X10(3) UL (ref 0–0.3)
EOSINOPHIL NFR BLD AUTO: 0.8 %
ERYTHROCYTE [DISTWIDTH] IN BLOOD BY AUTOMATED COUNT: 13.5 % (ref 11.5–16)
GLUCOSE BLD-MCNC: 149 MG/DL (ref 70–99)
HCT VFR BLD AUTO: 43 % (ref 37–53)
HGB BLD-MCNC: 14.4 G/DL (ref 13–17)
IMMATURE GRANULOCYTE COUNT: 0.03 X10(3) UL (ref 0–1)
IMMATURE GRANULOCYTE RATIO %: 0.6 %
LACTIC ACID: 1.8 MMOL/L (ref 0.5–2)
LYMPHOCYTES # BLD AUTO: 0.3 X10(3) UL (ref 0.9–4)
LYMPHOCYTES NFR BLD AUTO: 6 %
M PROTEIN MFR SERPL ELPH: 6.9 G/DL (ref 6.1–8.3)
MCH RBC QN AUTO: 31 PG (ref 27–33.2)
MCHC RBC AUTO-ENTMCNC: 33.5 G/DL (ref 31–37)
MCV RBC AUTO: 92.7 FL (ref 80–99)
MONOCYTES # BLD AUTO: 0.46 X10(3) UL (ref 0.1–0.6)
MONOCYTES NFR BLD AUTO: 9.2 %
NEUTROPHIL ABS PRELIM: 4.13 X10 (3) UL (ref 1.3–6.7)
NEUTROPHILS # BLD AUTO: 4.13 X10(3) UL (ref 1.3–6.7)
NEUTROPHILS NFR BLD AUTO: 83 %
PLATELET # BLD AUTO: 91 10(3)UL (ref 150–450)
POTASSIUM SERPL-SCNC: 4.3 MMOL/L (ref 3.6–5.1)
RBC # BLD AUTO: 4.64 X10(6)UL (ref 3.8–5.8)
RED CELL DISTRIBUTION WIDTH-SD: 45.9 FL (ref 35.1–46.3)
SODIUM SERPL-SCNC: 140 MMOL/L (ref 136–144)
WBC # BLD AUTO: 5 X10(3) UL (ref 4–13)

## 2018-01-12 PROCEDURE — 71045 X-RAY EXAM CHEST 1 VIEW: CPT | Performed by: EMERGENCY MEDICINE

## 2018-01-13 VITALS
DIASTOLIC BLOOD PRESSURE: 75 MMHG | HEIGHT: 70 IN | HEART RATE: 72 BPM | TEMPERATURE: 99 F | OXYGEN SATURATION: 98 % | SYSTOLIC BLOOD PRESSURE: 123 MMHG | BODY MASS INDEX: 38.22 KG/M2 | RESPIRATION RATE: 18 BRPM | WEIGHT: 267 LBS

## 2018-01-13 PROBLEM — R41.0 CONFUSION: Status: ACTIVE | Noted: 2018-01-13

## 2018-01-13 LAB
EST. AVERAGE GLUCOSE BLD GHB EST-MCNC: 123 MG/DL (ref 68–126)
GLUCOSE BLD-MCNC: 104 MG/DL (ref 65–99)
GLUCOSE BLD-MCNC: 106 MG/DL (ref 65–99)
GLUCOSE BLD-MCNC: 109 MG/DL (ref 65–99)
HBA1C MFR BLD HPLC: 5.9 % (ref ?–5.7)
LACTIC ACID: 1.6 MMOL/L (ref 0.5–2)

## 2018-01-13 PROCEDURE — 99220 INITIAL OBSERVATION CARE,LEVL III: CPT | Performed by: HOSPITALIST

## 2018-01-13 RX ORDER — SODIUM PHOSPHATE, DIBASIC AND SODIUM PHOSPHATE, MONOBASIC 7; 19 G/133ML; G/133ML
1 ENEMA RECTAL ONCE AS NEEDED
Status: DISCONTINUED | OUTPATIENT
Start: 2018-01-13 | End: 2018-01-13

## 2018-01-13 RX ORDER — POLYETHYLENE GLYCOL 3350 17 G/17G
17 POWDER, FOR SOLUTION ORAL DAILY PRN
Status: DISCONTINUED | OUTPATIENT
Start: 2018-01-13 | End: 2018-01-13

## 2018-01-13 RX ORDER — SODIUM CHLORIDE 9 MG/ML
INJECTION, SOLUTION INTRAVENOUS CONTINUOUS
Status: DISCONTINUED | OUTPATIENT
Start: 2018-01-13 | End: 2018-01-13

## 2018-01-13 RX ORDER — ACETAMINOPHEN 325 MG/1
650 TABLET ORAL EVERY 6 HOURS PRN
Status: DISCONTINUED | OUTPATIENT
Start: 2018-01-13 | End: 2018-01-13

## 2018-01-13 RX ORDER — ONDANSETRON 2 MG/ML
4 INJECTION INTRAMUSCULAR; INTRAVENOUS EVERY 6 HOURS PRN
Status: DISCONTINUED | OUTPATIENT
Start: 2018-01-13 | End: 2018-01-13

## 2018-01-13 RX ORDER — DOCUSATE SODIUM 100 MG/1
100 CAPSULE, LIQUID FILLED ORAL 2 TIMES DAILY
Status: DISCONTINUED | OUTPATIENT
Start: 2018-01-13 | End: 2018-01-13

## 2018-01-13 RX ORDER — DEXTROSE MONOHYDRATE 25 G/50ML
50 INJECTION, SOLUTION INTRAVENOUS
Status: DISCONTINUED | OUTPATIENT
Start: 2018-01-13 | End: 2018-01-13

## 2018-01-13 RX ORDER — ENOXAPARIN SODIUM 100 MG/ML
40 INJECTION SUBCUTANEOUS DAILY
Status: DISCONTINUED | OUTPATIENT
Start: 2018-01-13 | End: 2018-01-13

## 2018-01-13 RX ORDER — GABAPENTIN 100 MG/1
100 CAPSULE ORAL NIGHTLY
Status: DISCONTINUED | OUTPATIENT
Start: 2018-01-13 | End: 2018-01-13

## 2018-01-13 RX ORDER — BISACODYL 10 MG
10 SUPPOSITORY, RECTAL RECTAL
Status: DISCONTINUED | OUTPATIENT
Start: 2018-01-13 | End: 2018-01-13

## 2018-01-13 NOTE — PROGRESS NOTES
Patient seen and examined. Medically clear to discharge today. . Influenza +ve. Pt with minimal symptoms now. Onset of symptoms about 1 week ago. Ok to DC.      Sal Hutchison MD

## 2018-01-13 NOTE — PLAN OF CARE
NURSING ADMISSION NOTE      Patient admitted via Cart  Oriented to room. Safety precautions initiated. Bed in low position. Call light in reach. Pt appears alert and oriented, able to participate in admission navigator fully.  No complaints of pa

## 2018-01-13 NOTE — PROGRESS NOTES
NURSING DISCHARGE NOTE    Discharged Home via Wheelchair. Accompanied by Family member  Belongings Taken by patient/family. Pt AOx3. Son at bedside. PT unable to see pt during shift, RN ambulated with pt and walker with standby asst. Gait steady.

## 2018-01-13 NOTE — H&P
АЛЕКСАНДР HOSPITALIST  History and Physical     [facility] Check Patient Status:  Observation    1933 MRN [id number]   [facility] 4NW-A Attending Marla Gonsales MD   Hosp Day # 0 PCP TITA HAYES [address] [place] [address]     Chief Complaint: fever, chil Disp:  Rfl:    multivitamin Oral Tab Take 1 tablet by mouth daily.  Disp:  Rfl:    MetFORMIN HCl 500 MG Oral Tab Take 500 mg by mouth daily with breakfast.   Disp:  Rfl:    Multiple Vitamins-Minerals (PRESERVISION AREDS) Oral Tab Take by mouth 2 (two) times 14.4   MCV  92.7   PLT  91.0*       Recent Labs   Lab  01/12/18   2257   GLU  149*   BUN  13   CREATSERUM  1.11   CA  8.9   ALB  3.5   NA  140   K  4.3   CL  107   CO2  26.0   ALKPHO  62   AST  26   ALT  33   BILT  0.9   TP  6.9       Estimated Creatinine

## 2018-01-13 NOTE — ED PROVIDER NOTES
Patient Seen in: [facility] Emergency Department    History   Patient presents with:  Fever (infectious)    Stated Complaint: Sepsis    HPI    Is an 70-year-old male coming with complaints of fever.   Since yesterday the patient's been in bed he has shift.  There is no tongue elevation and palatine tonsils show no purulent material or erythema.   No submandibular erythema and no tenderness along the sternocleidomastoid and no nuchal rigidity  Lungs are clear to auscultation bilaterally with no wheezes himself he will be admitted for further evaluation    Admission disposition: 1/13/2018 12:22 AM           Disposition and Plan     Clinical Impression:  Confusion  (primary encounter diagnosis)    Disposition:  Admit  1/13/2018 12:22 am    Follow-up:  No f

## 2018-01-13 NOTE — ED INITIAL ASSESSMENT (HPI)
Pt c/o flu like symptoms for past 3-days, however, tonight Pt became dizzy and weak and contacted his PCP, who advised PT to come to ER for further assessment and IV hydration

## 2018-01-22 ENCOUNTER — APPOINTMENT (OUTPATIENT)
Dept: GENERAL RADIOLOGY | Facility: HOSPITAL | Age: 83
End: 2018-01-22
Attending: EMERGENCY MEDICINE
Payer: MEDICARE

## 2018-01-22 ENCOUNTER — HOSPITAL ENCOUNTER (EMERGENCY)
Facility: HOSPITAL | Age: 83
Discharge: HOME | End: 2018-01-22
Attending: EMERGENCY MEDICINE
Payer: MEDICARE

## 2018-01-22 VITALS
DIASTOLIC BLOOD PRESSURE: 66 MMHG | HEART RATE: 71 BPM | SYSTOLIC BLOOD PRESSURE: 119 MMHG | BODY MASS INDEX: 38.22 KG/M2 | TEMPERATURE: 98 F | OXYGEN SATURATION: 95 % | WEIGHT: 267 LBS | HEIGHT: 70 IN | RESPIRATION RATE: 18 BRPM

## 2018-01-22 DIAGNOSIS — M10.072 ACUTE IDIOPATHIC GOUT OF LEFT ANKLE: ICD-10-CM

## 2018-01-22 DIAGNOSIS — B34.9 VIRAL SYNDROME: Primary | ICD-10-CM

## 2018-01-22 LAB
BASOPHILS # BLD AUTO: 0.05 X10(3) UL (ref 0–0.1)
BASOPHILS NFR BLD AUTO: 0.7 %
BUN BLD-MCNC: 13 MG/DL (ref 8–20)
CALCIUM BLD-MCNC: 8.9 MG/DL (ref 8.3–10.3)
CHLORIDE: 106 MMOL/L (ref 101–111)
CO2: 27 MMOL/L (ref 22–32)
CREAT BLD-MCNC: 1.08 MG/DL (ref 0.7–1.3)
EOSINOPHIL # BLD AUTO: 0.21 X10(3) UL (ref 0–0.3)
EOSINOPHIL NFR BLD AUTO: 3 %
ERYTHROCYTE [DISTWIDTH] IN BLOOD BY AUTOMATED COUNT: 13.2 % (ref 11.5–16)
GLUCOSE BLD-MCNC: 144 MG/DL (ref 70–99)
HCT VFR BLD AUTO: 42.1 % (ref 37–53)
HGB BLD-MCNC: 14.2 G/DL (ref 13–17)
IMMATURE GRANULOCYTE COUNT: 0.26 X10(3) UL (ref 0–1)
IMMATURE GRANULOCYTE RATIO %: 3.8 %
LYMPHOCYTES # BLD AUTO: 1.02 X10(3) UL (ref 0.9–4)
LYMPHOCYTES NFR BLD AUTO: 14.8 %
MCH RBC QN AUTO: 30 PG (ref 27–33.2)
MCHC RBC AUTO-ENTMCNC: 33.7 G/DL (ref 31–37)
MCV RBC AUTO: 88.8 FL (ref 80–99)
MONOCYTES # BLD AUTO: 0.6 X10(3) UL (ref 0.1–0.6)
MONOCYTES NFR BLD AUTO: 8.7 %
NEUTROPHIL ABS PRELIM: 4.76 X10 (3) UL (ref 1.3–6.7)
NEUTROPHILS # BLD AUTO: 4.76 X10(3) UL (ref 1.3–6.7)
NEUTROPHILS NFR BLD AUTO: 69 %
PLATELET # BLD AUTO: 211 10(3)UL (ref 150–450)
POTASSIUM SERPL-SCNC: 3.8 MMOL/L (ref 3.6–5.1)
RBC # BLD AUTO: 4.74 X10(6)UL (ref 3.8–5.8)
RED CELL DISTRIBUTION WIDTH-SD: 43 FL (ref 35.1–46.3)
SODIUM SERPL-SCNC: 141 MMOL/L (ref 136–144)
WBC # BLD AUTO: 6.9 X10(3) UL (ref 4–13)

## 2018-01-22 PROCEDURE — 71045 X-RAY EXAM CHEST 1 VIEW: CPT | Performed by: EMERGENCY MEDICINE

## 2018-01-22 PROCEDURE — 99284 EMERGENCY DEPT VISIT MOD MDM: CPT

## 2018-01-22 PROCEDURE — 85025 COMPLETE CBC W/AUTO DIFF WBC: CPT | Performed by: EMERGENCY MEDICINE

## 2018-01-22 PROCEDURE — 80048 BASIC METABOLIC PNL TOTAL CA: CPT | Performed by: EMERGENCY MEDICINE

## 2018-01-22 PROCEDURE — 36415 COLL VENOUS BLD VENIPUNCTURE: CPT

## 2018-01-22 RX ORDER — METHYLPREDNISOLONE 4 MG/1
TABLET ORAL
Qty: 1 PACKAGE | Refills: 0 | Status: SHIPPED | OUTPATIENT
Start: 2018-01-22 | End: 2018-01-27

## 2018-01-22 NOTE — ED NOTES
Patient's daughter at bedside, states patients left ankle has been painful and red. Patient with a history of gout, MD at bedside to assess ankle.

## 2018-01-22 NOTE — ED INITIAL ASSESSMENT (HPI)
Patient arrives via EMS for n/v/d that began over the weekend. Patient was treated in ED over the weekend and has been tolerating oral fluids well. Medics state patients HR was elevated around 100 en route.

## 2018-01-22 NOTE — ED PROVIDER NOTES
Patient Seen in: [facility] Emergency Department    History   Patient presents with:  Nausea/Vomiting/Diarrhea (gastrointestinal)    Stated Complaint: n/v/d    HPI    Friendly 80-year-old male here for evaluation of persistent upper respiratory symp air)    Current:/66   Pulse 71   Temp 98.3 °F (36.8 °C) (Oral)   Resp 18   Ht 177.8 cm (5' 10\")   Wt 121.1 kg   SpO2 95%   BMI 38.31 kg/m²         Physical Exam      Constitutional: Pt is oriented to person, place, and time.  Appears well-developed a some persistent upper respiratory symptoms. He was flu positive. Overall though it seems like he has had an upward trajectory. We will check basic labs and a chest x-ray given his persistent cough.   He is at risk for developing a bacterial superinfect

## 2018-02-11 ENCOUNTER — HOSPITAL ENCOUNTER (EMERGENCY)
Facility: HOSPITAL | Age: 83
Discharge: HOME | End: 2018-02-11
Attending: EMERGENCY MEDICINE
Payer: MEDICARE

## 2018-02-11 ENCOUNTER — APPOINTMENT (OUTPATIENT)
Dept: GENERAL RADIOLOGY | Facility: HOSPITAL | Age: 83
End: 2018-02-11
Attending: EMERGENCY MEDICINE
Payer: MEDICARE

## 2018-02-11 VITALS
WEIGHT: 276 LBS | TEMPERATURE: 98 F | BODY MASS INDEX: 39.51 KG/M2 | HEIGHT: 70 IN | HEART RATE: 61 BPM | DIASTOLIC BLOOD PRESSURE: 73 MMHG | OXYGEN SATURATION: 97 % | RESPIRATION RATE: 21 BRPM | SYSTOLIC BLOOD PRESSURE: 123 MMHG

## 2018-02-11 DIAGNOSIS — R11.0 NAUSEA: Primary | ICD-10-CM

## 2018-02-11 DIAGNOSIS — R42 DIZZINESS: ICD-10-CM

## 2018-02-11 LAB
ALBUMIN SERPL-MCNC: 3.2 G/DL (ref 3.5–4.8)
ALP LIVER SERPL-CCNC: 72 U/L (ref 45–117)
ALT SERPL-CCNC: 27 U/L (ref 17–63)
AST SERPL-CCNC: 25 U/L (ref 15–41)
ATRIAL RATE: 64 BPM
BASOPHILS # BLD AUTO: 0.03 X10(3) UL (ref 0–0.1)
BASOPHILS NFR BLD AUTO: 0.5 %
BILIRUB SERPL-MCNC: 1 MG/DL (ref 0.1–2)
BUN BLD-MCNC: 11 MG/DL (ref 8–20)
CALCIUM BLD-MCNC: 9.3 MG/DL (ref 8.3–10.3)
CHLORIDE: 107 MMOL/L (ref 101–111)
CO2: 25 MMOL/L (ref 22–32)
CREAT BLD-MCNC: 0.97 MG/DL (ref 0.7–1.3)
EOSINOPHIL # BLD AUTO: 0.24 X10(3) UL (ref 0–0.3)
EOSINOPHIL NFR BLD AUTO: 4.2 %
ERYTHROCYTE [DISTWIDTH] IN BLOOD BY AUTOMATED COUNT: 14.3 % (ref 11.5–16)
GLUCOSE BLD-MCNC: 121 MG/DL (ref 70–99)
HCT VFR BLD AUTO: 42.9 % (ref 37–53)
HGB BLD-MCNC: 14.1 G/DL (ref 13–17)
IMMATURE GRANULOCYTE COUNT: 0.04 X10(3) UL (ref 0–1)
IMMATURE GRANULOCYTE RATIO %: 0.7 %
LYMPHOCYTES # BLD AUTO: 1.03 X10(3) UL (ref 0.9–4)
LYMPHOCYTES NFR BLD AUTO: 17.9 %
M PROTEIN MFR SERPL ELPH: 6.9 G/DL (ref 6.1–8.3)
MCH RBC QN AUTO: 30.5 PG (ref 27–33.2)
MCHC RBC AUTO-ENTMCNC: 32.9 G/DL (ref 31–37)
MCV RBC AUTO: 92.7 FL (ref 80–99)
MONOCYTES # BLD AUTO: 0.47 X10(3) UL (ref 0.1–1)
MONOCYTES NFR BLD AUTO: 8.2 %
NEUTROPHIL ABS PRELIM: 3.95 X10 (3) UL (ref 1.3–6.7)
NEUTROPHILS # BLD AUTO: 3.95 X10(3) UL (ref 1.3–6.7)
NEUTROPHILS NFR BLD AUTO: 68.5 %
P AXIS: 27 DEGREES
P-R INTERVAL: 178 MS
PLATELET # BLD AUTO: 122 10(3)UL (ref 150–450)
POTASSIUM SERPL-SCNC: 4.3 MMOL/L (ref 3.6–5.1)
Q-T INTERVAL: 384 MS
QRS DURATION: 82 MS
QTC CALCULATION (BEZET): 396 MS
R AXIS: -39 DEGREES
RBC # BLD AUTO: 4.63 X10(6)UL (ref 3.8–5.8)
RED CELL DISTRIBUTION WIDTH-SD: 47.8 FL (ref 35.1–46.3)
SODIUM SERPL-SCNC: 140 MMOL/L (ref 136–144)
T AXIS: 30 DEGREES
TROPONIN: <0.046 NG/ML (ref ?–0.05)
VENTRICULAR RATE: 64 BPM
WBC # BLD AUTO: 5.8 X10(3) UL (ref 4–13)

## 2018-02-11 PROCEDURE — 93010 ELECTROCARDIOGRAM REPORT: CPT

## 2018-02-11 PROCEDURE — 96361 HYDRATE IV INFUSION ADD-ON: CPT

## 2018-02-11 PROCEDURE — 85025 COMPLETE CBC W/AUTO DIFF WBC: CPT | Performed by: EMERGENCY MEDICINE

## 2018-02-11 PROCEDURE — 71045 X-RAY EXAM CHEST 1 VIEW: CPT | Performed by: EMERGENCY MEDICINE

## 2018-02-11 PROCEDURE — 84484 ASSAY OF TROPONIN QUANT: CPT | Performed by: EMERGENCY MEDICINE

## 2018-02-11 PROCEDURE — 96374 THER/PROPH/DIAG INJ IV PUSH: CPT

## 2018-02-11 PROCEDURE — 93005 ELECTROCARDIOGRAM TRACING: CPT

## 2018-02-11 PROCEDURE — 99285 EMERGENCY DEPT VISIT HI MDM: CPT

## 2018-02-11 PROCEDURE — 80053 COMPREHEN METABOLIC PANEL: CPT | Performed by: EMERGENCY MEDICINE

## 2018-02-11 RX ORDER — ONDANSETRON 4 MG/1
4 TABLET, ORALLY DISINTEGRATING ORAL EVERY 4 HOURS PRN
Qty: 10 TABLET | Refills: 0 | Status: SHIPPED | OUTPATIENT
Start: 2018-02-11 | End: 2018-02-18

## 2018-02-11 RX ORDER — ONDANSETRON 2 MG/ML
4 INJECTION INTRAMUSCULAR; INTRAVENOUS ONCE
Status: DISCONTINUED | OUTPATIENT
Start: 2018-02-11 | End: 2018-02-11

## 2018-02-11 RX ORDER — SODIUM CHLORIDE 9 MG/ML
INJECTION, SOLUTION INTRAVENOUS ONCE
Status: COMPLETED | OUTPATIENT
Start: 2018-02-11 | End: 2018-02-11

## 2018-02-11 NOTE — ED INITIAL ASSESSMENT (HPI)
Woke with jaw this morning that is more with chewing. Sudden onset of dizziness, nausea at lunch today. Denies chest pain, sob, n/t, or weakness.

## 2018-02-11 NOTE — ED NOTES
Patient reports he is not nauseated at the time, does not need zofran. Patient will notify RN if status changes. Will continue to monitor.

## 2018-02-11 NOTE — ED PROVIDER NOTES
Patient Seen in: [facility] Emergency Department    History   Patient presents with:  Dizziness (neurologic)    Stated Complaint: vertigo    HPI    The patient presents with possible vertigo.   The patient states that he was eating a salad at lunch w %  O2 Device: None (Room air)    Current:/73   Pulse 61   Temp 98.1 °F (36.7 °C) (Temporal)   Resp 21   Ht 177.8 cm (5' 10\")   Wt 125.2 kg   SpO2 97%   BMI 39.60 kg/m²         Physical Exam    General: Alert and oriented x3 in no acute distress.   HE Rhythm  Reading: Left axis deviation, inferior Q waves-old in no acute ischemic change         Xr Chest Ap Portable  (cpt=71045)    Result Date: 2/11/2018  PROCEDURE:  XR CHEST AP PORTABLE (CPT=71045)  TECHNIQUE:  AP chest radiograph was obtained.   Zandra Ortiz Guero Hill MD  [address] [place] [id number] [facility]  [phone]              Medications Prescribed:  Discharge Medication List as of 2/11/2018  2:12 PM    START taking these medications    ondansetron 4 MG Oral Tablet Dispersible  Take 1 tablet (

## 2018-04-17 NOTE — PLAN OF CARE
Altered Communication/Language Barrier    • Patient/Family is able to understand and participate in their care Progressing        DISCHARGE PLANNING    • Discharge to home or other facility with appropriate resources Progressing        Diabetes/Glucose Con no

## 2018-08-14 ENCOUNTER — NURSE ONLY (OUTPATIENT)
Dept: LAB | Age: 83
End: 2018-08-14
Attending: UROLOGY
Payer: MEDICARE

## 2018-08-14 DIAGNOSIS — C61 MALIGNANT NEOPLASM OF PROSTATE (HCC): Primary | ICD-10-CM

## 2018-08-14 LAB — PSA SERPL-MCNC: 0.05 NG/ML (ref 0.01–4)

## 2018-08-14 PROCEDURE — 84153 ASSAY OF PSA TOTAL: CPT

## 2018-08-14 PROCEDURE — 36415 COLL VENOUS BLD VENIPUNCTURE: CPT

## 2018-10-17 ENCOUNTER — HOSPITAL ENCOUNTER (OUTPATIENT)
Dept: GENERAL RADIOLOGY | Facility: HOSPITAL | Age: 83
Discharge: HOME | End: 2018-10-17
Attending: FAMILY MEDICINE
Payer: MEDICARE

## 2018-10-17 DIAGNOSIS — E11.9 DM (DIABETES MELLITUS) (HCC): ICD-10-CM

## 2018-10-17 DIAGNOSIS — M54.50 LUMBAGO: ICD-10-CM

## 2018-10-17 DIAGNOSIS — R26.89 IMPAIRMENT OF BALANCE: ICD-10-CM

## 2018-10-17 DIAGNOSIS — Z98.890 HX OF SPINAL SURGERY: ICD-10-CM

## 2018-10-17 DIAGNOSIS — G62.9 NEUROPATHY: ICD-10-CM

## 2018-10-17 PROCEDURE — 72110 X-RAY EXAM L-2 SPINE 4/>VWS: CPT | Performed by: FAMILY MEDICINE

## 2018-10-17 PROCEDURE — 72072 X-RAY EXAM THORAC SPINE 3VWS: CPT | Performed by: FAMILY MEDICINE

## 2020-03-04 ENCOUNTER — HOSPITAL ENCOUNTER (OUTPATIENT)
Facility: HOSPITAL | Age: 85
Setting detail: OBSERVATION
Discharge: HOME HEALTH | End: 2020-03-10
Attending: EMERGENCY MEDICINE | Admitting: HOSPITALIST
Payer: MEDICARE

## 2020-03-04 DIAGNOSIS — N28.9 ACUTE RENAL INSUFFICIENCY: ICD-10-CM

## 2020-03-04 DIAGNOSIS — R19.7 DIARRHEA, UNSPECIFIED TYPE: ICD-10-CM

## 2020-03-04 DIAGNOSIS — T83.511A URINARY TRACT INFECTION ASSOCIATED WITH INDWELLING URETHRAL CATHETER, INITIAL ENCOUNTER (HCC): Primary | ICD-10-CM

## 2020-03-04 DIAGNOSIS — N39.0 URINARY TRACT INFECTION ASSOCIATED WITH INDWELLING URETHRAL CATHETER, INITIAL ENCOUNTER (HCC): Primary | ICD-10-CM

## 2020-03-04 RX ORDER — SODIUM CHLORIDE 9 MG/ML
INJECTION, SOLUTION INTRAVENOUS CONTINUOUS
Status: CANCELLED | OUTPATIENT
Start: 2020-03-04 | End: 2020-03-05

## 2020-03-05 ENCOUNTER — APPOINTMENT (OUTPATIENT)
Dept: CT IMAGING | Facility: HOSPITAL | Age: 85
End: 2020-03-05
Attending: HOSPITALIST
Payer: MEDICARE

## 2020-03-05 PROBLEM — T83.511A URINARY TRACT INFECTION ASSOCIATED WITH INDWELLING URETHRAL CATHETER, INITIAL ENCOUNTER (HCC): Status: ACTIVE | Noted: 2020-03-05

## 2020-03-05 PROBLEM — R19.7 DIARRHEA, UNSPECIFIED TYPE: Status: ACTIVE | Noted: 2020-03-05

## 2020-03-05 PROBLEM — N39.0 URINARY TRACT INFECTION ASSOCIATED WITH INDWELLING URETHRAL CATHETER, INITIAL ENCOUNTER (HCC): Status: ACTIVE | Noted: 2020-03-05

## 2020-03-05 PROBLEM — N28.9 ACUTE RENAL INSUFFICIENCY: Status: ACTIVE | Noted: 2020-03-05

## 2020-03-05 PROCEDURE — 99220 INITIAL OBSERVATION CARE,LEVL III: CPT | Performed by: HOSPITALIST

## 2020-03-05 PROCEDURE — 74177 CT ABD & PELVIS W/CONTRAST: CPT | Performed by: HOSPITALIST

## 2020-03-05 RX ORDER — DIAZEPAM 5 MG/1
5 TABLET ORAL EVERY 8 HOURS PRN
Status: DISCONTINUED | OUTPATIENT
Start: 2020-03-05 | End: 2020-03-10

## 2020-03-05 RX ORDER — DIPHENOXYLATE HYDROCHLORIDE AND ATROPINE SULFATE 2.5; .025 MG/1; MG/1
1 TABLET ORAL 4 TIMES DAILY PRN
Status: DISCONTINUED | OUTPATIENT
Start: 2020-03-05 | End: 2020-03-10

## 2020-03-05 RX ORDER — PHENAZOPYRIDINE HYDROCHLORIDE 100 MG/1
100 TABLET, FILM COATED ORAL
Status: COMPLETED | OUTPATIENT
Start: 2020-03-05 | End: 2020-03-08

## 2020-03-05 RX ORDER — FLUTICASONE PROPIONATE 50 MCG
2 SPRAY, SUSPENSION (ML) NASAL DAILY
Status: DISCONTINUED | OUTPATIENT
Start: 2020-03-05 | End: 2020-03-10

## 2020-03-05 RX ORDER — SODIUM CHLORIDE 9 MG/ML
INJECTION, SOLUTION INTRAVENOUS CONTINUOUS
Status: DISCONTINUED | OUTPATIENT
Start: 2020-03-05 | End: 2020-03-06

## 2020-03-05 RX ORDER — MECLIZINE HYDROCHLORIDE 25 MG/1
25 TABLET ORAL 3 TIMES DAILY PRN
Status: DISCONTINUED | OUTPATIENT
Start: 2020-03-05 | End: 2020-03-10

## 2020-03-05 RX ORDER — POTASSIUM CHLORIDE 20 MEQ/1
40 TABLET, EXTENDED RELEASE ORAL ONCE
Status: COMPLETED | OUTPATIENT
Start: 2020-03-05 | End: 2020-03-05

## 2020-03-05 RX ORDER — GABAPENTIN 100 MG/1
100 CAPSULE ORAL NIGHTLY
Status: DISCONTINUED | OUTPATIENT
Start: 2020-03-05 | End: 2020-03-10

## 2020-03-05 RX ORDER — HYDROCODONE BITARTRATE AND ACETAMINOPHEN 5; 325 MG/1; MG/1
1 TABLET ORAL EVERY 6 HOURS PRN
Status: DISCONTINUED | OUTPATIENT
Start: 2020-03-05 | End: 2020-03-10

## 2020-03-05 RX ORDER — CEPHALEXIN 500 MG/1
500 CAPSULE ORAL 3 TIMES DAILY
Status: ON HOLD | COMMUNITY
Start: 2020-03-03 | End: 2020-03-08

## 2020-03-05 RX ORDER — DEXTROSE MONOHYDRATE 25 G/50ML
50 INJECTION, SOLUTION INTRAVENOUS
Status: DISCONTINUED | OUTPATIENT
Start: 2020-03-05 | End: 2020-03-10

## 2020-03-05 RX ORDER — ATROPA BELLADONNA AND OPIUM 16.2; 6 MG/1; MG/1
30 SUPPOSITORY RECTAL EVERY 6 HOURS PRN
Status: DISCONTINUED | OUTPATIENT
Start: 2020-03-05 | End: 2020-03-10

## 2020-03-05 NOTE — CM/SW NOTE
03/05/20 1340   CM/SW Screening   Referral [facility] staff; Chart review;Nursing rounds   Patient's Mental Status Alert;Oriented   Patient's Home Environment Senior Independent Housing   Number of Levels in Home 1   Katelyn Ch

## 2020-03-05 NOTE — PROGRESS NOTES
1000: authorization for medical records signed and sent to [facility] medical records. 1015: spoke with representative from [facility] Sin Lee) she will be sending records shortly.

## 2020-03-05 NOTE — ED PROVIDER NOTES
Patient Seen in: [facility] Emergency Department      History   Patient presents with:  Nausea/Vomiting/Diarrhea  Dehydration    Stated Complaint: diarrhea, dehydration    HPI  81 yo male who had bladder tumor removal on Monday presents via ems for distress. Appearance: He is well-developed. He is not ill-appearing or toxic-appearing. HENT:      Head: Normocephalic and atraumatic.       Right Ear: External ear normal.      Left Ear: External ear normal.      Nose: Nose normal.      Mouth/Throat: All other components within normal limits   HEMOGLOBIN A1C - Abnormal; Notable for the following components:    HgbA1C 6.3 (*)     Estimated Average Glucose 134 (*)     All other components within normal limits   BASIC METABOLIC PANEL (8) - Abnormal; Notab In process                   Please view results for these tests on the individual orders. STOOL CULTURE(P)   SHIGATOXIN   URINE CULTURE, ROUTINE                  MDM   Report given to Dr. Manolo Fritz who will await pt's results and dispo.     Admis

## 2020-03-05 NOTE — ED INITIAL ASSESSMENT (HPI)
Pt is from [facility], was recently discharged from [facility] where he was admitted for a bladder tumor removal. Per medics pts baseline is A/O x2. Per medics, pt was placed on antibiotics and now has had diarrhea x3 days and is hypotensive.

## 2020-03-05 NOTE — PHYSICAL THERAPY NOTE
PHYSICAL THERAPY EVALUATION - INPATIENT     Room Number: 524/524-A  Evaluation Date: 3/5/2020  Type of Evaluation: Initial  Physician Order: PT Eval and Treat    Presenting Problem: UTI  Reason for Therapy: Mobility Dysfunction and Discharge Planning STRENGTH ASSESSMENT  Upper extremity ROM and strength are within functional limits     Lower extremity ROM is within functional limits     Lower extremity strength is within functional limits     BALANCE  Static Sitting: Good  Dynamic Sitting: Good  Static met;Call light within reach;RN aware of session/findings; All patient questions and concerns addressed; Discussed recommendations with /    ASSESSMENT   Patient is a 80year old male admitted on 3/4/2020 for UTI.   Pertinent comorbidi

## 2020-03-05 NOTE — H&P
АЛЕКСАНДР HOSPITALIST  History and Physical     Jeromy Rose Marie Patient Status:  Observation    1933 MRN [id number]   [facility] 5NW-A Attending Drea Childress DO   Hosp Day # 0 PCP Lyndon Moreland  [facility]     Chief Complaint: diarrhea Oral Cap, Take 500 mg by mouth 3 (three) times daily. , Disp: , Rfl:   gabapentin 400 MG Oral Cap, Take 100 mg by mouth nightly.  , Disp: , Rfl:   multivitamin Oral Tab, Take 1 tablet by mouth daily. , Disp: , Rfl:   MetFORMIN HCl 500 MG Oral Tab, Take 500 m or lesions. Psychiatric: Appropriate mood and affect.       Diagnostic Data:      Labs:  Recent Labs   Lab 03/04/20  2223   WBC 10.5   HGB 13.8   MCV 92.3   .0       Recent Labs   Lab 03/04/20  2223   *   BUN 23*   CREATSERUM 1.33*   GFRAA 5

## 2020-03-05 NOTE — PROGRESS NOTES
[facility] Pharmacy Note: Antimicrobial Weight Based Dose Adjustment for: ceftriaxone (ROCEPHIN)    Reji Sheikh is a 80year old male who has been prescribed ceftriaxone (ROCEPHIN) 1 gm every 24 hours.     Estimated Creatinine Clearance: 45.5 mL/min (based on SC

## 2020-03-05 NOTE — ED NOTES
Per daughter, pt was discharged from the hospital yesterday and pt did have a tumor removed from his bladder on Monday and had diarrhea today and presented more confused.

## 2020-03-05 NOTE — ED PROVIDER NOTES
Pleasant 20-year-old male presented from independent living with diarrhea, confusion today. Patient reportedly had a bladder tumor resected at [facility] 2 days ago. His daughter reports even since surgery he has not had much of an appetite.   Has not been sleep

## 2020-03-05 NOTE — PROGRESS NOTES
03/05/20 0101   Provider Notification   Reason for Communication New consult   Provider Name Sanjuana Malcolm MD   Method of Communication Page   Response Waiting for response   Notification Time 0102

## 2020-03-05 NOTE — CONSULTS
[facility] Urology   Consultation Note    Autumn Crissy Patient Status:  Observation    1933 MRN [id number]   [facility] 5NW-A Attending Moses Nova MD   Hosp Day # 0 PCP Bernadette Billy MD [facility]     Reason for Consultation: papillary carcinoma. Pathologic Stage Classification (pTNM, AJCC 8th Edition):         pTa:      Noninvasive papillary carcinoma. Additional Pathologic Findings: Not identified. Per review of notes in care everywhere - had to be irrigated for clots. per tab 1 tablet, 1 tablet, Oral, Q6H PRN  •  cefTRIAXone Sodium (ROCEPHIN) 2 g in sodium chloride 0.9% 100 mL MBP/ADD-vantage, 2 g, Intravenous, Q24H    Review of Systems:  Pertinent items are noted in HPI.     Physical Exam:  /37 (BP Location: Left given its somewhat peripheral location though a nonobstructing stone is a consideration. No focal renal parenchymal abnormality. IMPRESSION:   1. No collecting system dilatation of either kidney.     2. Small (3-4 mm) possible cortical calcification v puente catheter at [facility] if appropriate. Will plan on voiding trial tomorrow morning. Above discussed with nurse, patient's daughter.       TULIO Melo  [facility] Urology

## 2020-03-05 NOTE — PLAN OF CARE
NURSING ADMISSION NOTE      Patient admitted via Cart  Oriented to room. Safety precautions initiated. Bed in low position. Call light in reach.       Problem: Patient/Family Goals  Goal: Patient/Family Long Term Goal  Description  Patient's Long Ter responsible for managing their own health  - Refer to Case Management Department for coordinating discharge planning if the patient needs post-hospital services based on physician/LIP order or complex needs related to functional status, cognitive ability o

## 2020-03-06 PROCEDURE — 99225 SUBSEQUENT OBSERVATION CARE: CPT | Performed by: HOSPITALIST

## 2020-03-06 RX ORDER — BUDESONIDE 3 MG/1
6 CAPSULE, COATED PELLETS ORAL DAILY
Status: DISCONTINUED | OUTPATIENT
Start: 2020-03-06 | End: 2020-03-10

## 2020-03-06 RX ORDER — POTASSIUM CHLORIDE 20 MEQ/1
40 TABLET, EXTENDED RELEASE ORAL ONCE
Status: COMPLETED | OUTPATIENT
Start: 2020-03-06 | End: 2020-03-06

## 2020-03-06 RX ORDER — QUETIAPINE 25 MG/1
25 TABLET, FILM COATED ORAL 2 TIMES DAILY
Status: DISCONTINUED | OUTPATIENT
Start: 2020-03-06 | End: 2020-03-08

## 2020-03-06 RX ORDER — QUETIAPINE 25 MG/1
25 TABLET, FILM COATED ORAL ONCE
Status: COMPLETED | OUTPATIENT
Start: 2020-03-06 | End: 2020-03-06

## 2020-03-06 RX ORDER — GARLIC EXTRACT 500 MG
1 CAPSULE ORAL DAILY
Status: DISCONTINUED | OUTPATIENT
Start: 2020-03-06 | End: 2020-03-10

## 2020-03-06 NOTE — PROGRESS NOTES
Patient with intermittent episodes of a.fib with ambulation    No previous history    Started on low dose BB    Check K and Mg    likely due to acute illness  Echo from 2017 reviewed  Consult cardiology of patient sustains  EKG    Marta Branch M.D.   Jayson Call

## 2020-03-06 NOTE — PROGRESS NOTES
Multidisciplinary Discharge Rounds held 3/5/2020. Treatment team members present today include , , Charge Nurse, Nurse, RT, PT and Pharmacy caring for Tamanna Herrera.      Other care providers present:    Mobility Goal: up to [facility]

## 2020-03-06 NOTE — PLAN OF CARE
Patient is AO x 3, forgetful at times, can be impulsive. Fall precautions maintained. Intermittently anxious - PRN oral valium, scheduled seroquel added, which patient is tolerating well. Maintains O2 sats on room air.  Tele - NSR that occasionally flips in Progressing  Goal: Patient/Family Short Term Goal  Description  Patient's Short Term Goal:   3/4 NOC: control diarrhea, stay safe and get some rest   3/5AM: see urology and have a POC  3/5 NOC: calm down and rest   3/6 AM: Get rid of urinary & rectal pain/ or complex needs related to functional status, cognitive ability or social support system  Outcome: Progressing     Problem: GASTROINTESTINAL - ADULT  Goal: Maintains or returns to baseline bowel function  Description  INTERVENTIONS:  - Assess bowel functi

## 2020-03-06 NOTE — PROGRESS NOTES
АЛЕКСАНДР HOSPITALIST  Progress Note     [facility] Patient Status:  Observation    1933 MRN [id number]   [facility] 5NW-A Attending Luis Kaye MD   Hosp Day # 0 PCP TITA HAYES [address] [place] [address]     Chief Complaint: Abdominal pain    S: Epic.    Medications:   • Potassium Chloride ER  40 mEq Oral Once   • Fluticasone Propionate  2 spray Each Nare Daily   • gabapentin  100 mg Oral Nightly   • Insulin Aspart Pen  1-10 Units Subcutaneous TID AC and HS   • cefTRIAXone  2 g Intravenous Q24H

## 2020-03-06 NOTE — PLAN OF CARE
Patient Aox3. Anxious. PRN meds given. Patient maintaining O2 sats on RA. YUMI protocol refuses CPAP. Patient NSR/a-fib  on tele. MD paged about a-fib. IV fluids. Patient c/o diarrhea PRN meds given. Patient with puente from recent bladder sx.  Draining orang needed discharge resources and transportation as appropriate  - Identify discharge learning needs (meds, wound care, etc)  - Arrange for interpreters to assist at discharge as needed  - Consider post-discharge preferences of patient/family/discharge partne

## 2020-03-06 NOTE — PROGRESS NOTES
03/06/20 0228   Provider Notification   Reason for Communication Review case   Provider Name Other (comment)  (Dr Antonia Mckeon)   Method of Communication Call   Response See orders   Notification Time [phone]     Pt became confused, agitated and aggressive.  Att

## 2020-03-06 NOTE — OCCUPATIONAL THERAPY NOTE
OCCUPATIONAL THERAPY EVALUATION - INPATIENT     Room Number: 524/524-A  Evaluation Date: 3/6/2020  Type of Evaluation: Initial  Presenting Problem: UTI    Physician Order: IP Consult to Occupational Therapy  Reason for Therapy: ADL/IADL Dysfunction and Dis ASSESSMENT  Rating: Unable to rate  Location: penile discomfort while urianting       COGNITION  Arousal/Alertness:  appropriate responses to stimuli  Attention Span:  attends with cues to redirect  Orientation Level:  oriented to place, oriented to time a EOB with supervision. Sit to stand from EOB, chair and toilet with CGa and cues for hand placement    Walked to/from bathroom w/ CGA and use of walker. Needs cueing to slow down and attend to environmental hazards.   Patient End of Session: Up in chair; OT Discharge Recommendations: Home with home health PT/OT  OT Device Recommendations: Shower chair    PLAN  OT Treatment Plan: Balance activities; Energy conservation/work simplification techniques;ADL training;Functional transfer training;Visual percep

## 2020-03-06 NOTE — PROGRESS NOTES
[facility]  Urology Progress Note    Era Steen Patient Status:  Observation    1933 MRN [id number]   [facility] 5NW-A Attending Jina Banks MD   Hosp Day # 0 PCP TITA Thompson     Subjective:  Adeelrob Steen is a(n) abx post-procedure).     Prostate cancer s/p XRT 2008    Plan:    Dr. Sundeep Ramires reviewed CT scan films - no further urological intervention required. Oconnor catheter has been removed. Check PVR bladder scan.   Pyridium prn  Hydration  Avoid bladder irritan

## 2020-03-06 NOTE — PROGRESS NOTES
Pt having intermittent episodes of afib with ambulation to bathroom, Dr Claritza sumner, ekg done,. Shows afib, rate of 79. Pt is asymptomatic, wctm.

## 2020-03-06 NOTE — PHYSICAL THERAPY NOTE
Duplicate PT order received this date. Pt evaluated by PT on 3/5 with recs for HHPT. Will f/u for further PT treatment sessions later during admit.

## 2020-03-07 PROCEDURE — 99225 SUBSEQUENT OBSERVATION CARE: CPT | Performed by: HOSPITALIST

## 2020-03-07 RX ORDER — POTASSIUM CHLORIDE 20 MEQ/1
40 TABLET, EXTENDED RELEASE ORAL ONCE
Status: COMPLETED | OUTPATIENT
Start: 2020-03-07 | End: 2020-03-07

## 2020-03-07 NOTE — PROGRESS NOTES
АЛЕКСАНДР HOSPITALIST  Progress Note     Dhara Sarmiento Patient Status:  Observation    1933 MRN [id number]   [facility] 5NW-A Attending Simeon Cota MD   Hosp Day # 0 PCP TITA HAYES [address] [place] [address]     Chief Complaint: Abdominal pain    S: INR in the last 168 hours. No results for input(s): TROP, CK in the last 168 hours. Imaging: Imaging data reviewed in Epic.     Medications:   • Potassium Chloride ER  40 mEq Oral Once   • QUEtiapine Fumarate  25 mg Oral BID   • Hydrocortisone Ac

## 2020-03-07 NOTE — PLAN OF CARE
Problem: Patient/Family Goals  Goal: Patient/Family Long Term Goal  Description  Patient's Long Term Goal: Discharge back to [facility]     Interventions:  - Follow plan of care  - See additional Care Plan goals for specific interventions   Outc Problem: Impaired Activities of Daily Living  Goal: Achieve highest/safest level of independence in self care  Description  Interventions:  - Assess ability and encourage patient to participate in ADLs to maximize function  - Promote sitting position i

## 2020-03-08 PROCEDURE — 99225 SUBSEQUENT OBSERVATION CARE: CPT | Performed by: HOSPITALIST

## 2020-03-08 RX ORDER — BUDESONIDE 3 MG/1
6 CAPSULE, COATED PELLETS ORAL DAILY
Qty: 28 CAPSULE | Refills: 0 | Status: SHIPPED | OUTPATIENT
Start: 2020-03-09 | End: 2020-03-23

## 2020-03-08 RX ORDER — POTASSIUM CHLORIDE 20 MEQ/1
40 TABLET, EXTENDED RELEASE ORAL ONCE
Status: COMPLETED | OUTPATIENT
Start: 2020-03-08 | End: 2020-03-08

## 2020-03-08 RX ORDER — CEPHALEXIN 500 MG/1
500 CAPSULE ORAL 3 TIMES DAILY
Qty: 21 CAPSULE | Refills: 0 | Status: SHIPPED | OUTPATIENT
Start: 2020-03-08 | End: 2020-03-15

## 2020-03-08 NOTE — PROGRESS NOTES
АЛЕКСАНДР HOSPITALIST  Progress Note     Autumn Woodward Patient Status:  Observation    1933 MRN [id number]   [facility] 5NW-A Attending Moses Nova MD   Hosp Day # 0 PCP TITA HAYES [address] [place] [address]     Chief Complaint: Abdominal pain    S: 7.3  --   --   --   --        Estimated Creatinine Clearance: 50.4 mL/min (based on SCr of 1.12 mg/dL). No results for input(s): PTP, INR in the last 168 hours. No results for input(s): TROP, CK in the last 168 hours.          Imaging: Imaging data re

## 2020-03-08 NOTE — PLAN OF CARE
Problem: Patient/Family Goals  Goal: Patient/Family Short Term Goal  Description  Patient's Short Term Goal:   3/4 NOC: control diarrhea, stay safe and get some rest   3/5AM: see urology and have a POC  3/5 NOC: calm down and rest   3/6 AM: Get rid of ur resources  Description  INTERVENTIONS:  - Identify barriers to discharge w/pt and caregiver  - Include patient/family/discharge partner in discharge planning  - Arrange for needed discharge resources and transportation as appropriate  - Identify discharge assistance with a walker. Daughter, Ziggy French, visited tonight. Discussed plan of care with pt and his daughter, both verbalized understanding. Safety and comfort measures provided, will monitor.

## 2020-03-08 NOTE — PLAN OF CARE
PROBLEM: Proctitis, UTI, abdominal pain, diarrhea    ASSESSMENT: Pt is A&Ox4, can be forgetful. On RA, YUMI no CPAP, denies SOB. NSR on tele. +4 BLE - pt states this is chronic, extremities are warm and dry. Tolerating diet.  Having frequent loose stools in

## 2020-03-08 NOTE — PROGRESS NOTES
Problem:   UTI    Data:  Alert and oriented. Forgetful. Room air. YUMI with CPAP. Tele, afib. SCD's. BLE swelling. No reports of diarrhea today. CDIFF negative. Accucheck QID. Urinary frequency/urgency, no visible clots seen. Up with assistance and walker.

## 2020-03-09 PROCEDURE — 99225 SUBSEQUENT OBSERVATION CARE: CPT | Performed by: HOSPITALIST

## 2020-03-09 RX ORDER — HALOPERIDOL 5 MG/ML
1 INJECTION INTRAMUSCULAR ONCE
Status: DISCONTINUED | OUTPATIENT
Start: 2020-03-09 | End: 2020-03-10

## 2020-03-09 RX ORDER — FUROSEMIDE 10 MG/ML
40 INJECTION INTRAMUSCULAR; INTRAVENOUS
Status: DISCONTINUED | OUTPATIENT
Start: 2020-03-09 | End: 2020-03-10

## 2020-03-09 NOTE — PROGRESS NOTES
Dr. Carri Suárez and [facility] notified of blood clots in the urine. Orders to monitor for retention. PVR.

## 2020-03-09 NOTE — PLAN OF CARE
Problem: Patient/Family Goals  Goal: Patient/Family Short Term Goal  Description  Patient's Short Term Goal:   3/4 NOC: control diarrhea, stay safe and get some rest   3/5AM: see urology and have a POC  3/5 NOC: calm down and rest   3/6 AM: Get rid of ur discharge planning if the patient needs post-hospital services based on physician/LIP order or complex needs related to functional status, cognitive ability or social support system  Outcome: Progressing     Problem: GASTROINTESTINAL - ADULT  Goal: James Hutson

## 2020-03-09 NOTE — PROGRESS NOTES
Problem:   UTI     Data:  Alert and oriented. Forgetful. Room air. YUMI with CPAP. Tele, afib. SCD's. BLE swelling. Complaining of right hip/thigh pain. Dr. Justice Burrell notified at bedside. Frequent soft BM's. CDIFF negative. Accucheck QID.  Urinary frequency/urg

## 2020-03-09 NOTE — PHYSICAL THERAPY NOTE
PHYSICAL THERAPY TREATMENT NOTE - INPATIENT    Room Number: 524/524-A     Session: 1   Number of Visits to Meet Established Goals: 3    History related to current admission: Pt was admitted from home on 3/4/2020 with UTI.  Pt has a past medical history sig bedclothes, sheets and blankets)?: A Little   -   Sitting down on and standing up from a chair with arms (e.g., wheelchair, bedside commode, etc.): A Little   -   Moving from lying on back to sitting on the side of the bed?: A Little   How much help from a PT     PLAN  PT Treatment Plan: Bed mobility; Body mechanics; Endurance; Energy conservation;Patient education; Family education;Strengthening;Stoop training;Stair training;Transfer training;Balance training  Rehab Potential : Good  Frequency (Obs): 3-5x/week

## 2020-03-09 NOTE — PROGRESS NOTES
АЛЕКСАНДР HOSPITALIST  Progress Note     Early Bassam Patient Status:  Observation    1933 MRN [id number]   [facility] 5NW-A Attending Jorge Bautista MD   Hosp Day # 0 PCP TITA HAYES [address] [place] [address]     Chief Complaint: Abdominal pain    S: --   --   --   --    AST 91*  --   --   --   --   --    ALT 53  --   --   --   --   --    BILT 1.6  --   --   --   --   --    TP 7.3  --   --   --   --   --     < > = values in this interval not displayed.        Estimated Creatinine Clearance: 50.4 mL/min

## 2020-03-09 NOTE — CM/SW NOTE
Per unit RN, Pt's daughter here with questions about OBS status. SW met with pt and his daughter at the bedside. Discussed obs status and answered her questions. Also discussed C recommendations.  Daughter said that [address] [place] [address] was arranged after recent d/c f

## 2020-03-10 VITALS
DIASTOLIC BLOOD PRESSURE: 62 MMHG | SYSTOLIC BLOOD PRESSURE: 112 MMHG | TEMPERATURE: 99 F | BODY MASS INDEX: 37.47 KG/M2 | HEIGHT: 71 IN | RESPIRATION RATE: 20 BRPM | OXYGEN SATURATION: 96 % | HEART RATE: 84 BPM | WEIGHT: 267.63 LBS

## 2020-03-10 PROCEDURE — 99217 OBSERVATION CARE DISCHARGE: CPT | Performed by: HOSPITALIST

## 2020-03-10 RX ORDER — POTASSIUM CHLORIDE 20 MEQ/1
40 TABLET, EXTENDED RELEASE ORAL ONCE
Status: COMPLETED | OUTPATIENT
Start: 2020-03-10 | End: 2020-03-10

## 2020-03-10 NOTE — OCCUPATIONAL THERAPY NOTE
OCCUPATIONAL THERAPY TREATMENT NOTE - INPATIENT     Room Number: 524/524-A  Session: 1   Number of Visits to Meet Established Goals: 5    Presenting Problem: UTI    History related to current admission: Pt was admitted from home on 3/4/2020 with UTI.  Pt ha such as brushing teeth?: None  -   Eating meals?: None    AM-PAC Score:  Score: 19  Approx Degree of Impairment: 42.8%  Standardized Score (AM-PAC Scale): 40.22  CMS Modifier (G-Code): CK    FUNCTIONAL TRANSFER ASSESSMENT  Supine to Sit : Supervision  Sit Goals:  Patient will transfer from sit to supine:  with supervision  Patient will transfer from supine to sit:  with supervision  Patient will transfer to toilet:  with supervision

## 2020-03-10 NOTE — CM/SW NOTE
03/10/20 1600   Discharge disposition   Expected discharge disposition Home-Health   Name of Facillity/Home Care/Hospice   [facility])

## 2020-03-10 NOTE — PROGRESS NOTES
Patient suddenly very anxious and confused. Patient can't pin point exactly what is bothering him and making him so nervous. Patient denies pain. States, \"I just got to get out of here\". Dr. Gisela Rios paged. Orders to give 1mg Haldol IV if needed.

## 2020-03-10 NOTE — CM/SW NOTE
Case discussed in rounds. THA notified Annemarie at [facility] of d/c, DU#[phone].  Info placed on avs.

## 2020-03-10 NOTE — PROGRESS NOTES
АЛЕКСАНДР HOSPITALIST  Progress Note     Tamanna Herrera Patient Status:  Observation    1933 MRN [id number]   [facility] 5NW-A Attending Steven Herron MD   Hosp Day # 0 PCP TITA HAYES [address] [facility]     Chief Complaint: Abdominal pain    S: 25.0 23.0  --  25.0  --   --  25.0   ALKPHO 71  --   --   --   --   --   --    AST 91*  --   --   --   --   --   --    ALT 53  --   --   --   --   --   --    BILT 1.6  --   --   --   --   --   --    TP 7.3  --   --   --   --   --   --     < > = values in t

## 2020-03-10 NOTE — DISCHARGE SUMMARY
АЛЕКСАНДР HOSPITALIST  DISCHARGE SUMMARY     Yudelka Stone Patient Status:  Observation    1933 MRN [id number]   [facility] 5NW-A Attending Sammie Sheldon MD   Hosp Day # 0 PCP Oscar Deleon     Date of Admission: 3/4/2020  Date LACE 29-58:  Moderate Risk of readmission after discharge from the hospital.    Procedures during hospitalization:   • None    Incidental or significant findings and recommendations (brief descriptions):  • None    Lab/Test results pending at Discharge:   · prescriptions at the location directed by your doctor or nurse    Bring a paper prescription for each of these medications  · Budesonide 3 MG Cpep  · cephALEXin 500 MG Caps         [place]    Follow-up appointment:   Aric Saucedo MD  7 B

## 2020-03-10 NOTE — PROGRESS NOTES
NURSING DISCHARGE NOTE    Discharged Home via Wheelchair. Accompanied by Family member  Belongings Taken by patient/family DISCHARGE INSRUCTIONS GIVEN AND REVIEWED ALL QUESTIONS ANSWERED. Iona Lamona iv removed without difficulty.

## 2020-03-10 NOTE — PLAN OF CARE
Problem: Patient/Family Goals  Goal: Patient/Family Long Term Goal  Description  Patient's Long Term Goal: Discharge back to [facility]     Interventions:  - Follow plan of care  - See additional Care Plan goals for specific interventions   Outc care, etc)  - Arrange for interpreters to assist at discharge as needed  - Consider post-discharge preferences of patient/family/discharge partner  - Complete POLST form as appropriate  - Assess patient's ability to be responsible for managing their own he air, , YUMI without CPAP. Pt with tele-afib/NSR. BLE edematous- TEDs, continue encourage elevate BLE pillow support, recliner- iv lasix. Pt voids- urinal or BRP, no clots noted at this time, urine clear/yellow, reports bm, passing gas.  Pt up with assist,

## 2020-03-11 NOTE — CM/SW NOTE
03/10/20 1600   Discharge disposition   Expected discharge disposition Home-Health   Name of Yara Chou [address]  [facility])   Discharge Date:   03/10/2020

## 2020-09-06 ENCOUNTER — HOSPITAL ENCOUNTER (OUTPATIENT)
Facility: HOSPITAL | Age: 85
Setting detail: OBSERVATION
Discharge: HOME HEALTH | End: 2020-09-07
Attending: EMERGENCY MEDICINE | Admitting: HOSPITALIST
Payer: MEDICARE

## 2020-09-06 ENCOUNTER — APPOINTMENT (OUTPATIENT)
Dept: GENERAL RADIOLOGY | Facility: HOSPITAL | Age: 85
End: 2020-09-06
Attending: EMERGENCY MEDICINE
Payer: MEDICARE

## 2020-09-06 DIAGNOSIS — R31.9 HEMATURIA, UNSPECIFIED TYPE: ICD-10-CM

## 2020-09-06 DIAGNOSIS — D69.6 THROMBOCYTOPENIA (HCC): ICD-10-CM

## 2020-09-06 DIAGNOSIS — M25.552 LEFT HIP PAIN: Primary | ICD-10-CM

## 2020-09-06 LAB
ALBUMIN SERPL-MCNC: 3.5 G/DL (ref 3.4–5)
ALBUMIN/GLOB SERPL: 1 {RATIO} (ref 1–2)
ALP LIVER SERPL-CCNC: 78 U/L (ref 45–117)
ALT SERPL-CCNC: 34 U/L (ref 16–61)
ANION GAP SERPL CALC-SCNC: 1 MMOL/L (ref 0–18)
APTT PPP: 29.5 SECONDS (ref 25.4–36.1)
AST SERPL-CCNC: 27 U/L (ref 15–37)
BASOPHILS # BLD AUTO: 0.03 X10(3) UL (ref 0–0.2)
BASOPHILS NFR BLD AUTO: 0.6 %
BILIRUB SERPL-MCNC: 0.5 MG/DL (ref 0.1–2)
BILIRUB UR QL STRIP.AUTO: NEGATIVE
BUN BLD-MCNC: 19 MG/DL (ref 7–18)
BUN/CREAT SERPL: 16.7 (ref 10–20)
CALCIUM BLD-MCNC: 9.2 MG/DL (ref 8.5–10.1)
CHLORIDE SERPL-SCNC: 109 MMOL/L (ref 98–112)
CO2 SERPL-SCNC: 29 MMOL/L (ref 21–32)
CREAT BLD-MCNC: 1.14 MG/DL (ref 0.7–1.3)
DEPRECATED RDW RBC AUTO: 49.4 FL (ref 35.1–46.3)
EOSINOPHIL # BLD AUTO: 0.29 X10(3) UL (ref 0–0.7)
EOSINOPHIL NFR BLD AUTO: 5.7 %
ERYTHROCYTE [DISTWIDTH] IN BLOOD BY AUTOMATED COUNT: 14 % (ref 11–15)
EST. AVERAGE GLUCOSE BLD GHB EST-MCNC: 131 MG/DL (ref 68–126)
GLOBULIN PLAS-MCNC: 3.6 G/DL (ref 2.8–4.4)
GLUCOSE BLD-MCNC: 136 MG/DL (ref 70–99)
GLUCOSE BLD-MCNC: 142 MG/DL (ref 70–99)
GLUCOSE BLD-MCNC: 160 MG/DL (ref 70–99)
GLUCOSE BLD-MCNC: 167 MG/DL (ref 70–99)
GLUCOSE BLD-MCNC: 224 MG/DL (ref 70–99)
GLUCOSE BLD-MCNC: 94 MG/DL (ref 70–99)
GLUCOSE UR STRIP.AUTO-MCNC: 50 MG/DL
HBA1C MFR BLD HPLC: 6.2 % (ref ?–5.7)
HCT VFR BLD AUTO: 46.5 % (ref 39–53)
HGB BLD-MCNC: 14.5 G/DL (ref 13–17.5)
IMM GRANULOCYTES # BLD AUTO: 0.06 X10(3) UL (ref 0–1)
IMM GRANULOCYTES NFR BLD: 1.2 %
INR BLD: 1.07 (ref 0.89–1.11)
KETONES UR STRIP.AUTO-MCNC: NEGATIVE MG/DL
LEUKOCYTE ESTERASE UR QL STRIP.AUTO: NEGATIVE
LYMPHOCYTES # BLD AUTO: 1.51 X10(3) UL (ref 1–4)
LYMPHOCYTES NFR BLD AUTO: 29.5 %
M PROTEIN MFR SERPL ELPH: 7.1 G/DL (ref 6.4–8.2)
MCH RBC QN AUTO: 30 PG (ref 26–34)
MCHC RBC AUTO-ENTMCNC: 31.2 G/DL (ref 31–37)
MCV RBC AUTO: 96.3 FL (ref 80–100)
MONOCYTES # BLD AUTO: 0.53 X10(3) UL (ref 0.1–1)
MONOCYTES NFR BLD AUTO: 10.4 %
NEUTROPHILS # BLD AUTO: 2.7 X10 (3) UL (ref 1.5–7.7)
NEUTROPHILS # BLD AUTO: 2.7 X10(3) UL (ref 1.5–7.7)
NEUTROPHILS NFR BLD AUTO: 52.6 %
NITRITE UR QL STRIP.AUTO: NEGATIVE
OSMOLALITY SERPL CALC.SUM OF ELEC: 290 MOSM/KG (ref 275–295)
PH UR STRIP.AUTO: 5 [PH] (ref 4.5–8)
PLATELET # BLD AUTO: 128 10(3)UL (ref 150–450)
POTASSIUM SERPL-SCNC: 4.4 MMOL/L (ref 3.5–5.1)
PROT UR STRIP.AUTO-MCNC: 100 MG/DL
PSA SERPL DL<=0.01 NG/ML-MCNC: 14.2 SECONDS (ref 12.4–14.6)
RBC # BLD AUTO: 4.83 X10(6)UL (ref 3.8–5.8)
RBC #/AREA URNS AUTO: >10 /HPF
SARS-COV-2 RNA RESP QL NAA+PROBE: NOT DETECTED
SODIUM SERPL-SCNC: 139 MMOL/L (ref 136–145)
SP GR UR STRIP.AUTO: 1.03 (ref 1–1.03)
UROBILINOGEN UR STRIP.AUTO-MCNC: <2 MG/DL
WBC # BLD AUTO: 5.1 X10(3) UL (ref 4–11)

## 2020-09-06 PROCEDURE — 73502 X-RAY EXAM HIP UNI 2-3 VIEWS: CPT | Performed by: EMERGENCY MEDICINE

## 2020-09-06 PROCEDURE — 99220 INITIAL OBSERVATION CARE,LEVL III: CPT | Performed by: HOSPITALIST

## 2020-09-06 RX ORDER — ENOXAPARIN SODIUM 100 MG/ML
40 INJECTION SUBCUTANEOUS DAILY
Status: DISCONTINUED | OUTPATIENT
Start: 2020-09-06 | End: 2020-09-06

## 2020-09-06 RX ORDER — DEXTROSE MONOHYDRATE 25 G/50ML
50 INJECTION, SOLUTION INTRAVENOUS
Status: DISCONTINUED | OUTPATIENT
Start: 2020-09-06 | End: 2020-09-07

## 2020-09-06 RX ORDER — MORPHINE SULFATE 2 MG/ML
4 INJECTION, SOLUTION INTRAMUSCULAR; INTRAVENOUS EVERY 2 HOUR PRN
Status: DISCONTINUED | OUTPATIENT
Start: 2020-09-06 | End: 2020-09-07

## 2020-09-06 RX ORDER — ACETAMINOPHEN 325 MG/1
650 TABLET ORAL EVERY 4 HOURS PRN
Status: DISCONTINUED | OUTPATIENT
Start: 2020-09-06 | End: 2020-09-07

## 2020-09-06 RX ORDER — MORPHINE SULFATE 2 MG/ML
4 INJECTION, SOLUTION INTRAMUSCULAR; INTRAVENOUS EVERY 30 MIN PRN
Status: DISCONTINUED | OUTPATIENT
Start: 2020-09-06 | End: 2020-09-06

## 2020-09-06 RX ORDER — MORPHINE SULFATE 2 MG/ML
2 INJECTION, SOLUTION INTRAMUSCULAR; INTRAVENOUS EVERY 2 HOUR PRN
Status: DISCONTINUED | OUTPATIENT
Start: 2020-09-06 | End: 2020-09-07

## 2020-09-06 RX ORDER — METHYLPREDNISOLONE SODIUM SUCCINATE 40 MG/ML
40 INJECTION, POWDER, LYOPHILIZED, FOR SOLUTION INTRAMUSCULAR; INTRAVENOUS ONCE
Status: COMPLETED | OUTPATIENT
Start: 2020-09-06 | End: 2020-09-06

## 2020-09-06 RX ORDER — HYDROCODONE BITARTRATE AND ACETAMINOPHEN 5; 325 MG/1; MG/1
2 TABLET ORAL EVERY 4 HOURS PRN
Status: DISCONTINUED | OUTPATIENT
Start: 2020-09-06 | End: 2020-09-07

## 2020-09-06 RX ORDER — MORPHINE SULFATE 2 MG/ML
1 INJECTION, SOLUTION INTRAMUSCULAR; INTRAVENOUS EVERY 2 HOUR PRN
Status: DISCONTINUED | OUTPATIENT
Start: 2020-09-06 | End: 2020-09-07

## 2020-09-06 RX ORDER — GABAPENTIN 100 MG/1
100 CAPSULE ORAL NIGHTLY
Status: DISCONTINUED | OUTPATIENT
Start: 2020-09-06 | End: 2020-09-07

## 2020-09-06 RX ORDER — HYDROCODONE BITARTRATE AND ACETAMINOPHEN 5; 325 MG/1; MG/1
1 TABLET ORAL EVERY 4 HOURS PRN
Status: DISCONTINUED | OUTPATIENT
Start: 2020-09-06 | End: 2020-09-07

## 2020-09-06 RX ORDER — FLUTICASONE PROPIONATE 50 MCG
2 SPRAY, SUSPENSION (ML) NASAL DAILY
Status: DISCONTINUED | OUTPATIENT
Start: 2020-09-06 | End: 2020-09-07

## 2020-09-06 RX ORDER — FINASTERIDE 5 MG/1
5 TABLET, FILM COATED ORAL DAILY
Status: DISCONTINUED | OUTPATIENT
Start: 2020-09-06 | End: 2020-09-07

## 2020-09-06 NOTE — ED INITIAL ASSESSMENT (HPI)
Per pt have chronic hematuria worsening per wife. Complained of ongoing L hip pain.  Per pt \"always urinating with blood clots\", able to empty bladder

## 2020-09-06 NOTE — PHYSICAL THERAPY NOTE
PHYSICAL THERAPY EVALUATION - INPATIENT     Room Number: 362/362-A  Evaluation Date: 9/6/2020  Type of Evaluation: Initial  Physician Order: PT Eval and Treat    Presenting Problem: L hip pain  Reason for Therapy: Mobility Dysfunction and Discharge P in single level apt with elevator. SUBJECTIVE  \"My left leg hurts when I try to move it\"    Patient self-stated goal is to have less pain. OBJECTIVE  Precautions: Bed/chair alarm;Hard of hearing; Low vision(L foot drop )  Fall Risk: High fall risk CK    FUNCTIONAL ABILITY STATUS  Gait Assessment   Gait Assistance: Contact guard assist  Distance (ft): 150  Assistive Device: Rolling walker  Pattern: L Decreased stance time;L Foot drop  Stoop/Curb Assistance: Not tested       Skilled Therapy Provided: increase risk for fall, and increase pain. Functional outcome measures completed include AMPAC. Based on this evaluation, patient's clinical presentation is stable and overall the evaluation complexity is considered low.   These impairments and comorbidit

## 2020-09-06 NOTE — PLAN OF CARE
Fall and DVT prevention in place. Patient and son updated, in agreement and progressing per POC. Pain controlled.

## 2020-09-06 NOTE — PLAN OF CARE
A/oX3/4, can be forgetful at times, very [place] with no hearing aids, , RA, IS, 1800 ADA diet with QID accuchecks, PT/OT eval, tolerating oral fluids, DTV 1100, updated on plan of care, pain controlled with PO and IV medications, d/c plan to be determined,

## 2020-09-06 NOTE — PLAN OF CARE
Arrived from ED  Report given by Michell Jones   Pain is \"low\"  Orientated to unit   Call light within reach  Will continue to monitor     0538: Dr. Melissa Pratt paged for admission orders    [phone]: Dr. Melissa Pratt at bedside

## 2020-09-06 NOTE — ED PROVIDER NOTES
Patient Seen in: [facility] Emergency Department      History   Patient presents with:  Urinary Symptoms    Stated Complaint: hematuria    HPI    This is a pleasant 59-year-old male presenting to the emergency department for back pain and hip pain t 0129]   /72   Pulse 50   Resp 16   Temp 98.6 °F (37 °C)   Temp src Temporal   SpO2 95 %   O2 Device None (Room air)       Current:BP (!) 185/90   Pulse 65   Temp 98.6 °F (37 °C) (Temporal)   Resp 16   Ht 177.8 cm (5' 10\")   Wt 127 kg   SpO2 92%   BM tests on the individual orders. MDM     Given the past patient's significant amount of pain he is a significant fall risk and very unsteady even with a walker here in the emergency department.   He will be admitted for pain control and furt

## 2020-09-06 NOTE — H&P
АЛЕКСАНДР HOSPITALIST  History and Physical     Valdezlaura Baez Patient Status:  Observation    1933 MRN [id number]   [facility] 3SW-A Attending Macey Suarez MD   Hosp Day # 0 PCP TITA HAYES [address] [facility]     Chief Complaint: hip pain daily., Disp: , Rfl:   MetFORMIN HCl 500 MG Oral Tab, Take 500 mg by mouth daily with breakfast.  , Disp: , Rfl:   Multiple Vitamins-Minerals (PRESERVISION AREDS) Oral Tab, Take by mouth 2 (two) times daily. , Disp: , Rfl:   Omega-3 Fatty Acids (FISH OIL) 1 GFRAA 67   GFRNAA 58*   CA 9.2   ALB 3.5      K 4.4      CO2 29.0   ALKPHO 78   AST 27   ALT 34   BILT 0.5   TP 7.1     Recent Labs   Lab 09/06/20  0155   PTP 14.2   INR 1.07     No results for input(s): TROP, CK in the last 168 hours.   Jon Lorenzo

## 2020-09-06 NOTE — PLAN OF CARE
Call placed to Dr. Latosha Kong office for new consult at approx [phone]. Patient and son Mary Anderson updated and in agreement with POC. Return call from Dr. Ene Molina MD reviewed chart and will see patient.  Dario Anderson off unit at this time- MD will attempt to see tim

## 2020-09-06 NOTE — CONSULTS
[facility]  Report of Consultation    Olivia Freeman Patient Status:  Observation    1933 MRN [id number]   [facility] 3SW-A Attending Amador Ramírez MD   Hosp Day # 0 PCP Hussein Rivas MD [facility]     Reason for Consultation:  Hematu Essential hypertension    • Gout    • Neuropathy     feet, ankles   • Visual impairment     macular degeneration     Past Surgical History:   Procedure Laterality Date   • CHOLECYSTECTOMY     • CYSTOURETHROSCOPY,FULGUR 2-5CM KENYATTA  03/02/2020    TURBT   • K methylPREDNISolone Sodium Succ (Solu-MEDROL) injection 40 mg, 40 mg, Intravenous, Once    Review of Systems:  Pertinent items are noted in HPI.     Physical Exam:  NAD  [place]  Son is present, there is also a daughter involved (not here) and the two of them ar renal insufficiency     Diarrhea, unspecified type     Left hip pain     Thrombocytopenia (HCC)     Hematuria, unspecified type     Morbid obesity with BMI of 40.0-44.9, adult ([facility]     Uncontrolled type 2 diabetes mellitus with hyperglycemia ([facility]    Marleny

## 2020-09-06 NOTE — ED NOTES
No relief from pain meds, or positioning, voided @ bedside commode, repositioned back on bed, verbalized pain better.  + hematuria

## 2020-09-06 NOTE — OCCUPATIONAL THERAPY NOTE
OCCUPATIONAL THERAPY EVALUATION - INPATIENT     Room Number: 362/362-A  Evaluation Date: 9/6/2020  Type of Evaluation: Initial  Presenting Problem: L hip pain, back pain, hematuria     Physician Order: IP Consult to Occupational Therapy  Reason for Therapy N/A  Hand Dominance: Right  Drives: No  Patient Regularly Uses: Glasses    Prior Level of Function: Pt lives at home with his wife. Pt reports that he is typically mod I with use of a rollator.      SUBJECTIVE   \"I have this pain that starts right here (L Maximum assistance  Sit to Stand: Minimum assistance    Skilled Therapy Provided: Pt was found in bed in a semi-supine position. Pt performed supine>sit EOB with max assistance.  Pt noted to have an inability to  L foot into plantar flexion, when amb Profile/Medical History MODERATE - Expanded review of history including review of medical or therapy record   Specific performance deficits impacting engagement in ADL/IADL MODERATE  3 - 5 performance deficits   Client Assessment/Performance Deficits MODER

## 2020-09-07 VITALS
TEMPERATURE: 99 F | BODY MASS INDEX: 40.09 KG/M2 | WEIGHT: 280 LBS | HEART RATE: 62 BPM | SYSTOLIC BLOOD PRESSURE: 136 MMHG | OXYGEN SATURATION: 93 % | RESPIRATION RATE: 18 BRPM | HEIGHT: 70 IN | DIASTOLIC BLOOD PRESSURE: 66 MMHG

## 2020-09-07 LAB
GLUCOSE BLD-MCNC: 135 MG/DL (ref 70–99)
GLUCOSE BLD-MCNC: 135 MG/DL (ref 70–99)

## 2020-09-07 PROCEDURE — 99217 OBSERVATION CARE DISCHARGE: CPT | Performed by: HOSPITALIST

## 2020-09-07 RX ORDER — METHYLPREDNISOLONE 4 MG/1
TABLET ORAL
Qty: 21 TABLET | Refills: 0 | Status: SHIPPED | OUTPATIENT
Start: 2020-09-07 | End: 2020-10-29 | Stop reason: ALTCHOICE

## 2020-09-07 NOTE — OCCUPATIONAL THERAPY NOTE
OCCUPATIONAL THERAPY TREATMENT NOTE - INPATIENT     Room Number: 362/362-A  Session: 1   Number of Visits to Meet Established Goals: 4    Presenting Problem: L hip pain, back pain, hematuria          History related to current admission: Pt is 80year old RESTRICTION  Weight Bearing Restriction: None                PAIN ASSESSMENT  Ratin  Location: N/A  Management Techniques:  Activity promotion;Relaxation;Repositioning     ACTIVITY TOLERANCE                         O2 SATURATIONS                ACTIVITI reports that his daughter is bringing his clothes later. Patient End of Session: Up in chair;Needs met;Call light within reach;RN aware of session/findings; All patient questions and concerns addressed;SCDs in place; Alarm set    ASSESSMENT   Patient is a supervision  Patient will transfer from sit to stand:  with supervision   Patient will transfer to toilet:  with supervision goal met 9/7/2020, LS        Additional Goals:  Pt will verbalize and incorporate 3/3 spinal precautions (no bending, lifting or tw

## 2020-09-07 NOTE — PLAN OF CARE
A&Ox4. Forgetful at times. Can also be impulsive and get up without calling. Tolerating well ambulating with min assist x1 and walker. Declining prn pain meds so far this shift. VSS. On RA. Voiding in bathroom. Urine color unchanged.  Fall precautions in pl

## 2020-09-07 NOTE — PROGRESS NOTES
Seen and examined; patient feeling better and wants to d/c home. Says his foot drop is 3years old and he has no new weakness. Is open to idea of spinal epidural injection with steroid in future but would rather pursue that outpatient.   Will d/c today with

## 2020-09-07 NOTE — PLAN OF CARE
Pt a&O. On room air. SCDs to BLE. Tolerating diabetic diet with good appetite. Blood sugars monitored and insulin given as ordered. Lst bM 9/5/20. Voiding w/o difficulty. Has hematuria. MDs aware. Will follow up with urology as outpatient.  Participating in

## 2020-09-07 NOTE — HOME CARE LIAISON
Received referral from THA Holloway. Per THA, call son Yong Herrera to discuss. Call made to Yong Herrera to discuss home health services and offer choice.  Yong Herrera stating that his dad resides at [facility], and is unsure on if they are letting in [address] [facility]

## 2020-09-07 NOTE — DISCHARGE SUMMARY
АЛЕКСАНДР HOSPITALIST  DISCHARGE SUMMARY     Elizabeth Dodson Patient Status:  Observation    1933 MRN [id number]   [facility] 3SW-A Attending No att. providers found   Hosp Day # 0 PCP TITA Pradhan     Date of Admission: 2020 hospitalization:   • none    Incidental or significant findings and recommendations (brief descriptions):  • none    Lab/Test results pending at Discharge:   · none    Consultants:  • urology    Discharge Medication List:     Discharge Medications      STA BEE Flan[place] [id number]  [phone]    In 2 weeks      Oliva Ksenia, [address] [phone]    Call  office cystoscopy    Katerina Mccarty MD  [address] [address]

## 2020-09-07 NOTE — CM/SW NOTE
MSW referred patient to [facility]  P:[phone]  F:[phone]. Liaison will offer services, provided choice, and financial disclosure.     Margie Pierson LCSW

## 2020-09-07 NOTE — PHYSICAL THERAPY NOTE
PHYSICAL THERAPY TREATMENT NOTE - INPATIENT    Room Number: 362/362-A     Session: 1  Number of Visits to Meet Established Goals: 4    Presenting Problem: L hip pain    Problem List  Principal Problem:    Left hip pain  Active Problems:     Thrombocytopeni adjusting bedclothes, sheets and blankets)?: A Little   -   Sitting down on and standing up from a chair with arms (e.g., wheelchair, bedside commode, etc.): A Little   -   Moving from lying on back to sitting on the side of the bed?: A Little   How much h Session: Up in chair; With [facility] staff;Needs met;Call light within reach;RN aware of session/findings; All patient questions and concerns addressed;SCDs in place    ASSESSMENT   This pt was admitted on 9/06/2020 for L worsening hip pain and chornic hematuria.

## 2020-09-08 NOTE — HOME CARE LIAISON
[place], they will allow Rizwan Powell in. Called son to arrange services. All questions answered.

## 2020-09-10 ENCOUNTER — HOSPITAL ENCOUNTER (EMERGENCY)
Facility: HOSPITAL | Age: 85
Discharge: HOME | End: 2020-09-10
Attending: EMERGENCY MEDICINE
Payer: MEDICARE

## 2020-09-10 VITALS
HEART RATE: 62 BPM | TEMPERATURE: 98 F | DIASTOLIC BLOOD PRESSURE: 93 MMHG | SYSTOLIC BLOOD PRESSURE: 181 MMHG | RESPIRATION RATE: 18 BRPM | OXYGEN SATURATION: 95 % | WEIGHT: 280 LBS | BODY MASS INDEX: 40 KG/M2

## 2020-09-10 DIAGNOSIS — M25.552 HIP PAIN, CHRONIC, LEFT: Primary | ICD-10-CM

## 2020-09-10 DIAGNOSIS — G89.29 HIP PAIN, CHRONIC, LEFT: Primary | ICD-10-CM

## 2020-09-10 DIAGNOSIS — R31.0 GROSS HEMATURIA: ICD-10-CM

## 2020-09-10 LAB
ALBUMIN SERPL-MCNC: 3.9 G/DL (ref 3.4–5)
ALBUMIN/GLOB SERPL: 1.1 {RATIO} (ref 1–2)
ALP LIVER SERPL-CCNC: 79 U/L (ref 45–117)
ALT SERPL-CCNC: 39 U/L (ref 16–61)
ANION GAP SERPL CALC-SCNC: 3 MMOL/L (ref 0–18)
AST SERPL-CCNC: 31 U/L (ref 15–37)
BASOPHILS # BLD AUTO: 0.05 X10(3) UL (ref 0–0.2)
BASOPHILS NFR BLD AUTO: 0.6 %
BILIRUB SERPL-MCNC: 0.8 MG/DL (ref 0.1–2)
BILIRUB UR QL STRIP.AUTO: NEGATIVE
BUN BLD-MCNC: 21 MG/DL (ref 7–18)
BUN/CREAT SERPL: 19.4 (ref 10–20)
CALCIUM BLD-MCNC: 9.6 MG/DL (ref 8.5–10.1)
CHLORIDE SERPL-SCNC: 103 MMOL/L (ref 98–112)
CO2 SERPL-SCNC: 29 MMOL/L (ref 21–32)
CREAT BLD-MCNC: 1.08 MG/DL (ref 0.7–1.3)
DEPRECATED RDW RBC AUTO: 48.3 FL (ref 35.1–46.3)
EOSINOPHIL # BLD AUTO: 0.05 X10(3) UL (ref 0–0.7)
EOSINOPHIL NFR BLD AUTO: 0.6 %
ERYTHROCYTE [DISTWIDTH] IN BLOOD BY AUTOMATED COUNT: 14 % (ref 11–15)
GLOBULIN PLAS-MCNC: 3.7 G/DL (ref 2.8–4.4)
GLUCOSE BLD-MCNC: 130 MG/DL (ref 70–99)
GLUCOSE UR STRIP.AUTO-MCNC: NEGATIVE MG/DL
HCT VFR BLD AUTO: 48.7 % (ref 39–53)
HGB BLD-MCNC: 15.8 G/DL (ref 13–17.5)
IMM GRANULOCYTES # BLD AUTO: 0.17 X10(3) UL (ref 0–1)
IMM GRANULOCYTES NFR BLD: 1.9 %
KETONES UR STRIP.AUTO-MCNC: NEGATIVE MG/DL
LEUKOCYTE ESTERASE UR QL STRIP.AUTO: NEGATIVE
LYMPHOCYTES # BLD AUTO: 1.44 X10(3) UL (ref 1–4)
LYMPHOCYTES NFR BLD AUTO: 16.4 %
M PROTEIN MFR SERPL ELPH: 7.6 G/DL (ref 6.4–8.2)
MCH RBC QN AUTO: 30.3 PG (ref 26–34)
MCHC RBC AUTO-ENTMCNC: 32.4 G/DL (ref 31–37)
MCV RBC AUTO: 93.5 FL (ref 80–100)
MONOCYTES # BLD AUTO: 0.51 X10(3) UL (ref 0.1–1)
MONOCYTES NFR BLD AUTO: 5.8 %
NEUTROPHILS # BLD AUTO: 6.54 X10 (3) UL (ref 1.5–7.7)
NEUTROPHILS # BLD AUTO: 6.54 X10(3) UL (ref 1.5–7.7)
NEUTROPHILS NFR BLD AUTO: 74.7 %
NITRITE UR QL STRIP.AUTO: NEGATIVE
OSMOLALITY SERPL CALC.SUM OF ELEC: 285 MOSM/KG (ref 275–295)
PH UR STRIP.AUTO: 6 [PH] (ref 4.5–8)
PLATELET # BLD AUTO: 144 10(3)UL (ref 150–450)
POTASSIUM SERPL-SCNC: 4.5 MMOL/L (ref 3.5–5.1)
PROT UR STRIP.AUTO-MCNC: 100 MG/DL
RBC # BLD AUTO: 5.21 X10(6)UL (ref 3.8–5.8)
RBC #/AREA URNS AUTO: >10 /HPF
SODIUM SERPL-SCNC: 135 MMOL/L (ref 136–145)
SP GR UR STRIP.AUTO: 1.01 (ref 1–1.03)
UROBILINOGEN UR STRIP.AUTO-MCNC: <2 MG/DL
WBC # BLD AUTO: 8.8 X10(3) UL (ref 4–11)

## 2020-09-10 PROCEDURE — 96376 TX/PRO/DX INJ SAME DRUG ADON: CPT

## 2020-09-10 PROCEDURE — 80053 COMPREHEN METABOLIC PANEL: CPT | Performed by: EMERGENCY MEDICINE

## 2020-09-10 PROCEDURE — 81001 URINALYSIS AUTO W/SCOPE: CPT | Performed by: EMERGENCY MEDICINE

## 2020-09-10 PROCEDURE — 96374 THER/PROPH/DIAG INJ IV PUSH: CPT

## 2020-09-10 PROCEDURE — 99285 EMERGENCY DEPT VISIT HI MDM: CPT

## 2020-09-10 PROCEDURE — 99284 EMERGENCY DEPT VISIT MOD MDM: CPT

## 2020-09-10 PROCEDURE — 85025 COMPLETE CBC W/AUTO DIFF WBC: CPT | Performed by: EMERGENCY MEDICINE

## 2020-09-10 PROCEDURE — 96375 TX/PRO/DX INJ NEW DRUG ADDON: CPT

## 2020-09-10 RX ORDER — ONDANSETRON 2 MG/ML
INJECTION INTRAMUSCULAR; INTRAVENOUS
Status: COMPLETED
Start: 2020-09-10 | End: 2020-09-10

## 2020-09-10 RX ORDER — HYDROMORPHONE HYDROCHLORIDE 1 MG/ML
INJECTION, SOLUTION INTRAMUSCULAR; INTRAVENOUS; SUBCUTANEOUS
Status: COMPLETED
Start: 2020-09-10 | End: 2020-09-10

## 2020-09-10 RX ORDER — HYDROMORPHONE HYDROCHLORIDE 1 MG/ML
0.5 INJECTION, SOLUTION INTRAMUSCULAR; INTRAVENOUS; SUBCUTANEOUS ONCE
Status: COMPLETED | OUTPATIENT
Start: 2020-09-10 | End: 2020-09-10

## 2020-09-10 RX ORDER — ONDANSETRON 2 MG/ML
4 INJECTION INTRAMUSCULAR; INTRAVENOUS ONCE
Status: COMPLETED | OUTPATIENT
Start: 2020-09-10 | End: 2020-09-10

## 2020-09-10 RX ORDER — MORPHINE SULFATE 2 MG/ML
4 INJECTION, SOLUTION INTRAMUSCULAR; INTRAVENOUS EVERY 30 MIN PRN
Status: DISCONTINUED | OUTPATIENT
Start: 2020-09-10 | End: 2020-09-10

## 2020-09-10 RX ORDER — HYDROCODONE BITARTRATE AND ACETAMINOPHEN 5; 325 MG/1; MG/1
1-2 TABLET ORAL EVERY 6 HOURS PRN
Qty: 15 TABLET | Refills: 0 | Status: SHIPPED | OUTPATIENT
Start: 2020-09-10 | End: 2020-09-17

## 2020-09-10 RX ORDER — HYDROMORPHONE HYDROCHLORIDE 1 MG/ML
0.5 INJECTION, SOLUTION INTRAMUSCULAR; INTRAVENOUS; SUBCUTANEOUS EVERY 30 MIN PRN
Status: DISCONTINUED | OUTPATIENT
Start: 2020-09-10 | End: 2020-09-10

## 2020-09-10 NOTE — ED NOTES
Pt screaming out in pain stating help me, RN at bedside, offered pt a pillow, help with repositioning. Pt yelling get me out of here.  MD aware, pain medication ordered and given

## 2020-09-10 NOTE — ED INITIAL ASSESSMENT (HPI)
Pt here from [address] [facility] for left hip pain that has been going on the past few weeks. Pt was d/c on 9/7 and states they didn't figure out why I had hip pain.        Pt states hip pain is worse with movement

## 2020-09-10 NOTE — ED NOTES
Pt yelling out in pain, RN asked pt what she could do to help him, pt stating that he wants to get up and walk, RN clarified with pt stating the last time I moved him in the bed that he cried out in pain, pt stating I dont know.  RN offered pt ice pack, rep

## 2020-09-10 NOTE — ED PROVIDER NOTES
Patient Seen in: [facility] Emergency Department      History   Patient presents with:  Pain    Stated Complaint: left hip pain, blood in urine    HPI    80year-old male presents for evaluation of left hip pain.   Patient states he has an aching colt Alcohol use: No    Drug use: No             Review of Systems    Positive for stated complaint: left hip pain, blood in urine  Other systems are as noted in HPI. Constitutional and vital signs reviewed.       All other systems reviewed and negative except -----------         ------                     CBC W/ DIFFERENTIAL[[id number]]          Abnormal            Final result                 Please view results for these tests on the individual orders.    RAINBOW DRAW BLUE   RAINBOW DRAW LAVENDER   MARIEE Dosepak. [address] [facility] is willing to provide 24-hour caretaker until next week when the patient can move to [facility]. I will provide him a prescription for Norco to treat pain.       Disposition and Plan     Clinical Impression:  Hip pain, chroni

## 2020-10-07 ENCOUNTER — LAB REQUISITION (OUTPATIENT)
Dept: LAB | Facility: HOSPITAL | Age: 85
End: 2020-10-07
Payer: MEDICARE

## 2020-10-07 DIAGNOSIS — N17.9 ACUTE KIDNEY FAILURE, UNSPECIFIED (HCC): ICD-10-CM

## 2020-10-07 DIAGNOSIS — N30.90 CYSTITIS, UNSPECIFIED WITHOUT HEMATURIA: ICD-10-CM

## 2020-10-07 PROCEDURE — 87086 URINE CULTURE/COLONY COUNT: CPT | Performed by: INTERNAL MEDICINE

## 2020-10-07 PROCEDURE — 81001 URINALYSIS AUTO W/SCOPE: CPT | Performed by: INTERNAL MEDICINE

## 2020-10-07 PROCEDURE — 85025 COMPLETE CBC W/AUTO DIFF WBC: CPT | Performed by: INTERNAL MEDICINE

## 2020-10-08 ENCOUNTER — LAB REQUISITION (OUTPATIENT)
Dept: LAB | Facility: HOSPITAL | Age: 85
End: 2020-10-08
Payer: MEDICARE

## 2020-10-08 DIAGNOSIS — Z11.1 ENCOUNTER FOR SCREENING FOR RESPIRATORY TUBERCULOSIS: ICD-10-CM

## 2020-10-08 DIAGNOSIS — N30.40 IRRADIATION CYSTITIS WITHOUT HEMATURIA: ICD-10-CM

## 2020-10-08 PROCEDURE — 86480 TB TEST CELL IMMUN MEASURE: CPT | Performed by: INTERNAL MEDICINE

## 2020-10-08 PROCEDURE — 80053 COMPREHEN METABOLIC PANEL: CPT | Performed by: INTERNAL MEDICINE

## 2020-10-27 ENCOUNTER — NURSE ONLY (OUTPATIENT)
Dept: LAB | Age: 85
End: 2020-10-27
Attending: UROLOGY
Payer: MEDICARE

## 2020-10-27 DIAGNOSIS — R31.0 GROSS HEMATURIA: ICD-10-CM

## 2020-10-27 PROCEDURE — 87086 URINE CULTURE/COLONY COUNT: CPT

## 2020-10-29 ENCOUNTER — ANESTHESIA EVENT (OUTPATIENT)
Dept: SURGERY | Facility: HOSPITAL | Age: 85
End: 2020-10-29
Payer: MEDICARE

## 2020-10-29 ENCOUNTER — APPOINTMENT (OUTPATIENT)
Dept: LAB | Facility: HOSPITAL | Age: 85
End: 2020-10-29
Payer: MEDICARE

## 2020-10-29 ENCOUNTER — LAB ENCOUNTER (OUTPATIENT)
Dept: LAB | Facility: HOSPITAL | Age: 85
End: 2020-10-29
Payer: MEDICARE

## 2020-10-29 DIAGNOSIS — R31.0 GROSS HEMATURIA: ICD-10-CM

## 2020-10-29 DIAGNOSIS — Z85.46 HISTORY OF PROSTATE CANCER: ICD-10-CM

## 2020-10-29 PROCEDURE — 84153 ASSAY OF PSA TOTAL: CPT

## 2020-10-29 PROCEDURE — 93010 ELECTROCARDIOGRAM REPORT: CPT | Performed by: INTERNAL MEDICINE

## 2020-10-29 PROCEDURE — 36415 COLL VENOUS BLD VENIPUNCTURE: CPT

## 2020-10-29 PROCEDURE — 93005 ELECTROCARDIOGRAM TRACING: CPT

## 2020-10-29 RX ORDER — ACETAMINOPHEN 325 MG/1
650 TABLET ORAL EVERY 6 HOURS PRN
COMMUNITY

## 2020-10-30 ENCOUNTER — HOSPITAL ENCOUNTER (OUTPATIENT)
Facility: HOSPITAL | Age: 85
Discharge: HOME HEALTH | End: 2020-10-31
Attending: UROLOGY | Admitting: UROLOGY
Payer: MEDICARE

## 2020-10-30 ENCOUNTER — ANESTHESIA (OUTPATIENT)
Dept: SURGERY | Facility: HOSPITAL | Age: 85
End: 2020-10-30
Payer: MEDICARE

## 2020-10-30 DIAGNOSIS — Z85.51 PERSONAL HISTORY OF MALIGNANT NEOPLASM OF BLADDER: ICD-10-CM

## 2020-10-30 DIAGNOSIS — R31.0 GROSS HEMATURIA: Primary | ICD-10-CM

## 2020-10-30 PROCEDURE — 82962 GLUCOSE BLOOD TEST: CPT

## 2020-10-30 PROCEDURE — 36410 VNPNXR 3YR/> PHY/QHP DX/THER: CPT | Performed by: UROLOGY

## 2020-10-30 PROCEDURE — 0T5C8ZZ DESTRUCTION OF BLADDER NECK, VIA NATURAL OR ARTIFICIAL OPENING ENDOSCOPIC: ICD-10-PCS | Performed by: INTERNAL MEDICINE

## 2020-10-30 PROCEDURE — 76937 US GUIDE VASCULAR ACCESS: CPT | Performed by: UROLOGY

## 2020-10-30 RX ORDER — EPHEDRINE SULFATE 50 MG/ML
INJECTION, SOLUTION INTRAVENOUS AS NEEDED
Status: DISCONTINUED | OUTPATIENT
Start: 2020-10-30 | End: 2020-10-30 | Stop reason: SURG

## 2020-10-30 RX ORDER — KETOROLAC TROMETHAMINE 30 MG/ML
30 INJECTION, SOLUTION INTRAMUSCULAR; INTRAVENOUS EVERY 6 HOURS PRN
Status: DISCONTINUED | OUTPATIENT
Start: 2020-10-30 | End: 2020-10-31

## 2020-10-30 RX ORDER — LIDOCAINE HYDROCHLORIDE 10 MG/ML
INJECTION, SOLUTION EPIDURAL; INFILTRATION; INTRACAUDAL; PERINEURAL AS NEEDED
Status: DISCONTINUED | OUTPATIENT
Start: 2020-10-30 | End: 2020-10-30 | Stop reason: SURG

## 2020-10-30 RX ORDER — SODIUM CHLORIDE, SODIUM LACTATE, POTASSIUM CHLORIDE, CALCIUM CHLORIDE 600; 310; 30; 20 MG/100ML; MG/100ML; MG/100ML; MG/100ML
INJECTION, SOLUTION INTRAVENOUS CONTINUOUS
Status: DISCONTINUED | OUTPATIENT
Start: 2020-10-30 | End: 2020-10-31

## 2020-10-30 RX ORDER — ACETAMINOPHEN 500 MG
1000 TABLET ORAL EVERY 6 HOURS PRN
Status: DISCONTINUED | OUTPATIENT
Start: 2020-10-30 | End: 2020-10-31

## 2020-10-30 RX ORDER — DIAZEPAM 5 MG/ML
5 INJECTION, SOLUTION INTRAMUSCULAR; INTRAVENOUS EVERY 6 HOURS PRN
Status: DISCONTINUED | OUTPATIENT
Start: 2020-10-30 | End: 2020-10-31

## 2020-10-30 RX ORDER — DEXAMETHASONE SODIUM PHOSPHATE 4 MG/ML
VIAL (ML) INJECTION AS NEEDED
Status: DISCONTINUED | OUTPATIENT
Start: 2020-10-30 | End: 2020-10-30 | Stop reason: SURG

## 2020-10-30 RX ORDER — DEXTROSE MONOHYDRATE 25 G/50ML
50 INJECTION, SOLUTION INTRAVENOUS
Status: DISCONTINUED | OUTPATIENT
Start: 2020-10-30 | End: 2020-10-30 | Stop reason: HOSPADM

## 2020-10-30 RX ORDER — HYDROMORPHONE HYDROCHLORIDE 1 MG/ML
0.4 INJECTION, SOLUTION INTRAMUSCULAR; INTRAVENOUS; SUBCUTANEOUS EVERY 5 MIN PRN
Status: DISCONTINUED | OUTPATIENT
Start: 2020-10-30 | End: 2020-10-30 | Stop reason: HOSPADM

## 2020-10-30 RX ORDER — LIDOCAINE HYDROCHLORIDE 20 MG/ML
JELLY TOPICAL AS NEEDED
Status: DISCONTINUED | OUTPATIENT
Start: 2020-10-30 | End: 2020-10-30 | Stop reason: HOSPADM

## 2020-10-30 RX ORDER — ONDANSETRON 2 MG/ML
INJECTION INTRAMUSCULAR; INTRAVENOUS AS NEEDED
Status: DISCONTINUED | OUTPATIENT
Start: 2020-10-30 | End: 2020-10-30 | Stop reason: SURG

## 2020-10-30 RX ORDER — HYDROCODONE BITARTRATE AND ACETAMINOPHEN 5; 325 MG/1; MG/1
1 TABLET ORAL EVERY 4 HOURS PRN
Status: DISCONTINUED | OUTPATIENT
Start: 2020-10-30 | End: 2020-10-31

## 2020-10-30 RX ORDER — OXYBUTYNIN CHLORIDE 5 MG/1
5 TABLET ORAL EVERY 6 HOURS PRN
Status: DISCONTINUED | OUTPATIENT
Start: 2020-10-30 | End: 2020-10-31

## 2020-10-30 RX ORDER — ATROPA BELLADONNA AND OPIUM 16.2; 6 MG/1; MG/1
1 SUPPOSITORY RECTAL EVERY 8 HOURS PRN
Status: DISCONTINUED | OUTPATIENT
Start: 2020-10-30 | End: 2020-10-31

## 2020-10-30 RX ORDER — MORPHINE SULFATE 4 MG/ML
0.5 INJECTION, SOLUTION INTRAMUSCULAR; INTRAVENOUS EVERY 2 HOUR PRN
Status: DISCONTINUED | OUTPATIENT
Start: 2020-10-30 | End: 2020-10-31

## 2020-10-30 RX ORDER — HYDROCODONE BITARTRATE AND ACETAMINOPHEN 5; 325 MG/1; MG/1
1 TABLET ORAL AS NEEDED
Status: DISCONTINUED | OUTPATIENT
Start: 2020-10-30 | End: 2020-10-30 | Stop reason: HOSPADM

## 2020-10-30 RX ORDER — DIPHENHYDRAMINE HYDROCHLORIDE 50 MG/ML
12.5 INJECTION INTRAMUSCULAR; INTRAVENOUS NIGHTLY PRN
Status: DISCONTINUED | OUTPATIENT
Start: 2020-10-30 | End: 2020-10-31

## 2020-10-30 RX ORDER — CEFAZOLIN SODIUM/WATER 2 G/20 ML
2 SYRINGE (ML) INTRAVENOUS EVERY 8 HOURS
Status: DISCONTINUED | OUTPATIENT
Start: 2020-10-30 | End: 2020-10-30 | Stop reason: SDUPTHER

## 2020-10-30 RX ORDER — DIPHENHYDRAMINE HCL 25 MG
25 CAPSULE ORAL NIGHTLY PRN
Status: DISCONTINUED | OUTPATIENT
Start: 2020-10-30 | End: 2020-10-31

## 2020-10-30 RX ORDER — DIAZEPAM 5 MG/ML
5 INJECTION, SOLUTION INTRAMUSCULAR; INTRAVENOUS ONCE
Status: DISCONTINUED | OUTPATIENT
Start: 2020-10-30 | End: 2020-10-30

## 2020-10-30 RX ORDER — DEXTROSE MONOHYDRATE 25 G/50ML
50 INJECTION, SOLUTION INTRAVENOUS
Status: DISCONTINUED | OUTPATIENT
Start: 2020-10-30 | End: 2020-10-31

## 2020-10-30 RX ORDER — MORPHINE SULFATE 4 MG/ML
2 INJECTION, SOLUTION INTRAMUSCULAR; INTRAVENOUS EVERY 2 HOUR PRN
Status: DISCONTINUED | OUTPATIENT
Start: 2020-10-30 | End: 2020-10-31

## 2020-10-30 RX ORDER — HYDROMORPHONE HYDROCHLORIDE 1 MG/ML
INJECTION, SOLUTION INTRAMUSCULAR; INTRAVENOUS; SUBCUTANEOUS
Status: COMPLETED
Start: 2020-10-30 | End: 2020-10-30

## 2020-10-30 RX ORDER — MORPHINE SULFATE 4 MG/ML
1 INJECTION, SOLUTION INTRAMUSCULAR; INTRAVENOUS EVERY 2 HOUR PRN
Status: DISCONTINUED | OUTPATIENT
Start: 2020-10-30 | End: 2020-10-31

## 2020-10-30 RX ORDER — ACETAMINOPHEN 325 MG/1
650 TABLET ORAL EVERY 4 HOURS PRN
Status: DISCONTINUED | OUTPATIENT
Start: 2020-10-30 | End: 2020-10-31

## 2020-10-30 RX ORDER — ACETAMINOPHEN 500 MG
1000 TABLET ORAL ONCE AS NEEDED
Status: DISCONTINUED | OUTPATIENT
Start: 2020-10-30 | End: 2020-10-30 | Stop reason: HOSPADM

## 2020-10-30 RX ORDER — HYDROCODONE BITARTRATE AND ACETAMINOPHEN 5; 325 MG/1; MG/1
2 TABLET ORAL EVERY 4 HOURS PRN
Status: DISCONTINUED | OUTPATIENT
Start: 2020-10-30 | End: 2020-10-30

## 2020-10-30 RX ORDER — LIDOCAINE HYDROCHLORIDE 20 MG/ML
10 JELLY TOPICAL
Status: DISCONTINUED | OUTPATIENT
Start: 2020-10-30 | End: 2020-10-31

## 2020-10-30 RX ORDER — HYDROCODONE BITARTRATE AND ACETAMINOPHEN 5; 325 MG/1; MG/1
2 TABLET ORAL AS NEEDED
Status: DISCONTINUED | OUTPATIENT
Start: 2020-10-30 | End: 2020-10-30 | Stop reason: HOSPADM

## 2020-10-30 RX ORDER — ACETAMINOPHEN 500 MG
1000 TABLET ORAL ONCE
Status: DISCONTINUED | OUTPATIENT
Start: 2020-10-30 | End: 2020-10-31

## 2020-10-30 RX ORDER — ONDANSETRON 2 MG/ML
4 INJECTION INTRAMUSCULAR; INTRAVENOUS AS NEEDED
Status: DISCONTINUED | OUTPATIENT
Start: 2020-10-30 | End: 2020-10-30 | Stop reason: HOSPADM

## 2020-10-30 RX ORDER — GABAPENTIN 100 MG/1
100 CAPSULE ORAL NIGHTLY
Status: DISCONTINUED | OUTPATIENT
Start: 2020-10-30 | End: 2020-10-31

## 2020-10-30 RX ORDER — CEFAZOLIN SODIUM/WATER 2 G/20 ML
2 SYRINGE (ML) INTRAVENOUS EVERY 8 HOURS
Status: COMPLETED | OUTPATIENT
Start: 2020-10-30 | End: 2020-10-31

## 2020-10-30 RX ORDER — OXYBUTYNIN CHLORIDE 5 MG/1
5 TABLET ORAL EVERY 8 HOURS PRN
Status: DISCONTINUED | OUTPATIENT
Start: 2020-10-30 | End: 2020-10-31

## 2020-10-30 RX ORDER — SODIUM CHLORIDE, SODIUM LACTATE, POTASSIUM CHLORIDE, CALCIUM CHLORIDE 600; 310; 30; 20 MG/100ML; MG/100ML; MG/100ML; MG/100ML
INJECTION, SOLUTION INTRAVENOUS CONTINUOUS
Status: DISCONTINUED | OUTPATIENT
Start: 2020-10-30 | End: 2020-10-30 | Stop reason: HOSPADM

## 2020-10-30 RX ORDER — KETOROLAC TROMETHAMINE 30 MG/ML
15 INJECTION, SOLUTION INTRAMUSCULAR; INTRAVENOUS EVERY 6 HOURS PRN
Status: DISCONTINUED | OUTPATIENT
Start: 2020-10-30 | End: 2020-10-30

## 2020-10-30 RX ORDER — NALOXONE HYDROCHLORIDE 0.4 MG/ML
80 INJECTION, SOLUTION INTRAMUSCULAR; INTRAVENOUS; SUBCUTANEOUS AS NEEDED
Status: DISCONTINUED | OUTPATIENT
Start: 2020-10-30 | End: 2020-10-30 | Stop reason: HOSPADM

## 2020-10-30 RX ADMIN — EPHEDRINE SULFATE 10 MG: 50 INJECTION, SOLUTION INTRAVENOUS at 10:43:00

## 2020-10-30 RX ADMIN — ONDANSETRON 4 MG: 2 INJECTION INTRAMUSCULAR; INTRAVENOUS at 10:36:00

## 2020-10-30 RX ADMIN — DEXAMETHASONE SODIUM PHOSPHATE 4 MG: 4 MG/ML VIAL (ML) INJECTION at 10:10:00

## 2020-10-30 RX ADMIN — SODIUM CHLORIDE, SODIUM LACTATE, POTASSIUM CHLORIDE, CALCIUM CHLORIDE: 600; 310; 30; 20 INJECTION, SOLUTION INTRAVENOUS at 11:24:00

## 2020-10-30 RX ADMIN — LIDOCAINE HYDROCHLORIDE 50 MG: 10 INJECTION, SOLUTION EPIDURAL; INFILTRATION; INTRACAUDAL; PERINEURAL at 10:06:00

## 2020-10-30 NOTE — CONSULTS
[facility] Hospitalist Initial Consult       Reason for Consult: Medical Management sp cysto for gross hematuria      History of Present Illness: Patient is a 80year old male with PMH sig for bladder CA, DM, HTN, YMUI who presents sp the above procedure.  He kamari TOTAL KNEE REPLACEMENT      bilateral      Social History    Tobacco Use      Smoking status: Former Smoker        Years: 30.00        Quit date: 1986        Years since quittin.8      Smokeless tobacco: Never Used    Alcohol use: No       Family and previous hospital records reviewed. Thank you for allowing me to participate in the care of this patient.      Misa De La Rosa MD  [facility] Hospitalist  Pager: [phone]

## 2020-10-30 NOTE — CM/SW NOTE
MSW acknowledged order for dc planning. Patient lives at [facility]. Await PT/OT input.     Caitlin Bravo LCSW

## 2020-10-30 NOTE — ANESTHESIA POSTPROCEDURE EVALUATION
[address] Patient Status:  Hospital Outpatient Surgery   Age/Gender 80year old male MRN [id number]   Location [address] [facility] Attending Hermila Mejia MD   Hosp Day # 0 PCP TITA Cobb [facility]

## 2020-10-30 NOTE — PROGRESS NOTES
Spoke with dr Justin Sahni re: pt's c/o pain. md states to call surgeon to discuss possibility of removing puente. Dr Larisa Acevedo paged @ this time. Awaiting return call.

## 2020-10-30 NOTE — OR PREOP
Patient and daughter request overnight stay post surgery. Patient had this same surgery in March 2020 at [facility] and was sent home. Paramedics needed to be called and patient was admitted to [facility] for 10 days.   Spoke with Dr Orlin Martin and will speak

## 2020-10-30 NOTE — ANESTHESIA PREPROCEDURE EVALUATION
PRE-OP EVALUATION    Patient Name: Avelino Norman    Pre-op Diagnosis: Gross hematuria [R31.0]  Personal history of malignant neoplasm of bladder [Z85.51]    Procedure(s):  CYSTOSCOPY, BILATERAL RETROGRADE PYELOGRAM, POSSIBLE TRANSURETHRAL RESECTION OF BLAD 1 MG/ML injection 0.4 mg, 0.4 mg, Intravenous, Q5 Min PRN    •  fentaNYL citrate (SUBLIMAZE) 0.05 MG/ML injection 25 mcg, 25 mcg, Intravenous, Q5 Min PRN    •  ondansetron HCl (ZOFRAN) injection 4 mg, 4 mg, Intravenous, PRN    •  Trimethobenzamide HCl (TIG FB  TM distance: 4 - 6 cm  Neck ROM: full Cardiovascular      Rhythm: regular  Rate: normal     Dental      Dental appliance(s): upper dentures and lower dentures       Pulmonary      Breath sounds clear to auscultation bilaterally.                Other fin

## 2020-10-30 NOTE — PROGRESS NOTES
This note also relates to the following rows which could not be included:  Pulse - Cannot attach notes to unvalidated device data  BP - Cannot attach notes to unvalidated device data  MAP (mmHg) - Cannot attach notes to unvalidated device data  SpO2 - Carmen Broaden

## 2020-10-30 NOTE — PROGRESS NOTES
Pt continues to report  pain to penis @ puente insertion site. Urojet, morphine & norco given w/out relief. Dr Rosalia Aragon paged @ this time. Awaiting return call.

## 2020-10-30 NOTE — ANESTHESIA PROCEDURE NOTES
Airway  Date/Time: 10/30/2020 10:08 AM  Urgency: elective      General Information and Staff    Patient location during procedure: OR  Anesthesiologist: Sujit Rojas MD  Resident/CRNA: Nishant Mello CRNA  Performed: CRNA     Indications and Patient

## 2020-10-30 NOTE — CONSULTS
[facility] Pharmacy Note:  Age Based Dose Adjustment    Reji Michaels has been prescribed ketorolac (TORADOL) 30 mg IV every 6 hours prn. Patient is >71 years old therefore the dose has been adjusted to 15 mg IV every 6 hours prn.       Thank you,  Jovanny Lopez,

## 2020-10-30 NOTE — OPERATIVE REPORT
Operative Note    Patient Name: Rosalea Drain    Preoperative Diagnosis: GROSS HEMATURIA, HISTORY OF BLADDER TUMOR    Postoperative Diagnosis: Fixed prostate from previous radiation, radiation cystitis change, difficult anterior bladder/bladder neck bleeding Jonel Wan MD - Primary     Assistant: None    Procedures: Cystoscopy under anesthesia, clot evacuation, fulguration of bleeding bladder tumor, fulguration of surrounding urothelium.     Surgical Findings: Fixed bladder neck, 25 cc of organized clot, Bonnie Sandoval(Attending) bladder/prostate fixation from radiation was not helping is here. We are ultimately able to gain hemostasis, fulgurate visualized tumor with the button electrode vaporization tool.   Resectoscopic sheath and apparatus was atraumatically removed and replace

## 2020-10-30 NOTE — H&P
NO CHANGES FROM 10.27.2020 PRE OPERATIVE DOCUMENTATION FROM PATIENT'S PRIMARY CARE PROVIDER, DR. Nasir Bojorquez, FROM [facility].         Cystoscopy under anesthesia, retrograde pyelography, TURBT/fulguration - pending operative findings for gross hematu • Multiple Vitamins-Minerals (PRESERVISION AREDS) Oral Tab Take by mouth 2 (two) times daily.       • Fluticasone Propionate 50 MCG/ACT Nasal Suspension 2 sprays by Each Nare route daily.  1 Bottle 11   • triamcinolone acetonide 0.1 % External Cream Apply JONNATHAN 1.08 01/22/2018      I have personally reviewed the contemporary provided radiographic images individually, and/or with the patient, as reported for completeness and comprehension of today's visit:     CONCLUSION:       1.  There is a punctate fo daily.  -     OFFICE/OUTPT VISIT,NEWSTEVEN IV     History of benign bladder tumor  -     finasteride 5 MG Oral Tab;  Take 1 tablet (5 mg total) by mouth daily.  -     OFFICE/OUTPT VISIT,STEVEN ISABEL IV     Declining performance status

## 2020-10-30 NOTE — ANESTHESIA PREPROCEDURE EVALUATION
PRE-OP EVALUATION    Patient Name: Garlin Marker    Pre-op Diagnosis: Gross hematuria [R31.0]  Personal history of malignant neoplasm of bladder [Z85.51]    Procedure(s):  CYSTOSCOPY, BILATERAL RETROGRADE PYELOGRAM, POSSIBLE TRANSURETHRAL RESECTION OF BLAD • TOTAL KNEE REPLACEMENT      bilateral     Social History    Tobacco Use      Smoking status: Former Smoker        Quit date: 1986        Years since quittin.8      Smokeless tobacco: Never Used    Alcohol use: No      Drug use: No     Availabl

## 2020-10-31 VITALS
DIASTOLIC BLOOD PRESSURE: 60 MMHG | OXYGEN SATURATION: 96 % | HEIGHT: 70.5 IN | WEIGHT: 280 LBS | HEART RATE: 68 BPM | TEMPERATURE: 98 F | RESPIRATION RATE: 18 BRPM | BODY MASS INDEX: 39.64 KG/M2 | SYSTOLIC BLOOD PRESSURE: 125 MMHG

## 2020-10-31 PROCEDURE — 80048 BASIC METABOLIC PNL TOTAL CA: CPT | Performed by: UROLOGY

## 2020-10-31 PROCEDURE — 85027 COMPLETE CBC AUTOMATED: CPT | Performed by: UROLOGY

## 2020-10-31 PROCEDURE — 82962 GLUCOSE BLOOD TEST: CPT

## 2020-10-31 PROCEDURE — 97110 THERAPEUTIC EXERCISES: CPT | Performed by: PHYSICAL THERAPIST

## 2020-10-31 PROCEDURE — 97161 PT EVAL LOW COMPLEX 20 MIN: CPT | Performed by: PHYSICAL THERAPIST

## 2020-10-31 NOTE — PROGRESS NOTES
[facility] Hospitalist Progress Note                                                                   [address]  12/28/1933    CC: FU post op    Interval History:  - Doing much better, cindi bertrand   - CBI off  - Hgb noted, no ongoing bleeding on exam     Post op pain- he much better with puente out     HTNL: not on home meds, monitor BP     DM: hold orals, SSI    OK for home today from IM standpoint.        Outpatient records and previous hospital

## 2020-10-31 NOTE — PLAN OF CARE
Patient is alert and oriented x3; occasionally forgetful; compulsive. Fall precautions remain in place  Patient voided x3 s/p puente removal.   Tolerating diet  Denies pain    Patient assessed and ready for DC.  All instructions review with patient whom verb evaluate response  - Consider cultural and social influences on pain and pain management  - Manage/alleviate anxiety  - Utilize distraction and/or relaxation techniques  - Monitor for opioid side effects  - Notify MD/LIP if interventions unsuccessful or pa

## 2020-10-31 NOTE — PHYSICAL THERAPY NOTE
PHYSICAL THERAPY QUICK EVALUATION - INPATIENT    Room Number: 0485/1842-P  Evaluation Date: 10/31/2020  Presenting Problem: (s/p gross hematuria )  Physician Order: PT Eval and Treat    Problem List  Active Problems:    Gross hematuria      Past Medical extremity ROM is within functional limits     Lower extremity strength is within functional limits     NEUROLOGICAL FINDINGS                      ACTIVITY TOLERANCE                         O2 WALK                  AM-PAC '6-Clicks' INPATIENT SHORT FORM - B aware of session/findings; All patient questions and concerns addressed; Alarm set; Discussed recommendations with /    ASSESSMENT   Patient is a 80year old male admitted on 10/30/2020 for gross hematuria .   Pertinent comorbidities a

## 2020-10-31 NOTE — PROGRESS NOTES
Progress Note    Justyna Perez Patient Status:  Outpatient in a Bed    1933 MRN [id number]   [facility] 0SW-A Attending Nikolay De Jesus MD   Hosp Day # 0 PCP TITA Fisher     Subjective:  Justyna Peerz is a(n) 80year old

## 2020-10-31 NOTE — PLAN OF CARE
Pt is a/ox4, VSS, compliant with poc, clear liquid diet. denies pain, nausea or vomiting, accu QID, up with standby and walker. IVF infusing . CBI slow draining light pink, no clots. Bilateral LE edema, 3+ pitting.  Call light within reach, will continue to pre-medicate as appropriate  Outcome: Progressing     Problem: RISK FOR INFECTION - ADULT  Goal: Absence of fever/infection during anticipated neutropenic period  Description: INTERVENTIONS  - Monitor WBC  - Administer growth factors as ordered  - Lacey

## 2021-01-01 ENCOUNTER — APPOINTMENT (OUTPATIENT)
Dept: GENERAL RADIOLOGY | Facility: HOSPITAL | Age: 86
End: 2021-01-01
Attending: EMERGENCY MEDICINE
Payer: MEDICARE

## 2021-01-01 ENCOUNTER — APPOINTMENT (OUTPATIENT)
Dept: CT IMAGING | Facility: HOSPITAL | Age: 86
DRG: 693 | End: 2021-01-01
Attending: EMERGENCY MEDICINE
Payer: MEDICARE

## 2021-01-01 ENCOUNTER — HOSPITAL ENCOUNTER (INPATIENT)
Facility: HOSPITAL | Age: 86
LOS: 4 days | Discharge: NURSING, REHAB OR LONG-TERM CARE | DRG: 693 | End: 2021-01-01
Attending: EMERGENCY MEDICINE | Admitting: HOSPITALIST
Payer: MEDICARE

## 2021-01-01 ENCOUNTER — APPOINTMENT (OUTPATIENT)
Dept: CT IMAGING | Facility: HOSPITAL | Age: 86
DRG: 661 | End: 2021-01-01
Attending: EMERGENCY MEDICINE
Payer: MEDICARE

## 2021-01-01 ENCOUNTER — EXTERNAL FACILITY (OUTPATIENT)
Dept: FAMILY MEDICINE CLINIC | Facility: CLINIC | Age: 86
End: 2021-01-01

## 2021-01-01 ENCOUNTER — APPOINTMENT (OUTPATIENT)
Dept: GENERAL RADIOLOGY | Facility: HOSPITAL | Age: 86
DRG: 693 | End: 2021-01-01
Attending: HOSPITALIST
Payer: MEDICARE

## 2021-01-01 ENCOUNTER — APPOINTMENT (OUTPATIENT)
Dept: GENERAL RADIOLOGY | Facility: HOSPITAL | Age: 86
DRG: 661 | End: 2021-01-01
Attending: UROLOGY
Payer: MEDICARE

## 2021-01-01 ENCOUNTER — ANESTHESIA EVENT (OUTPATIENT)
Dept: SURGERY | Facility: HOSPITAL | Age: 86
DRG: 661 | End: 2021-01-01
Payer: MEDICARE

## 2021-01-01 ENCOUNTER — HOSPITAL ENCOUNTER (INPATIENT)
Facility: HOSPITAL | Age: 86
LOS: 1 days | Discharge: NURSING, REHAB OR LONG-TERM CARE | DRG: 661 | End: 2021-01-01
Attending: EMERGENCY MEDICINE | Admitting: HOSPITALIST
Payer: MEDICARE

## 2021-01-01 ENCOUNTER — HOSPITAL ENCOUNTER (EMERGENCY)
Facility: HOSPITAL | Age: 86
Discharge: HOME | DRG: 661 | End: 2021-01-01
Attending: EMERGENCY MEDICINE
Payer: MEDICARE

## 2021-01-01 ENCOUNTER — APPOINTMENT (OUTPATIENT)
Dept: GENERAL RADIOLOGY | Facility: HOSPITAL | Age: 86
DRG: 661 | End: 2021-01-01
Attending: EMERGENCY MEDICINE
Payer: MEDICARE

## 2021-01-01 ENCOUNTER — TELEPHONE (OUTPATIENT)
Dept: INTERNAL MEDICINE CLINIC | Age: 86
End: 2021-01-01

## 2021-01-01 ENCOUNTER — APPOINTMENT (OUTPATIENT)
Dept: CV DIAGNOSTICS | Facility: HOSPITAL | Age: 86
End: 2021-01-01
Attending: INTERNAL MEDICINE
Payer: MEDICARE

## 2021-01-01 ENCOUNTER — ANESTHESIA (OUTPATIENT)
Dept: SURGERY | Facility: HOSPITAL | Age: 86
DRG: 661 | End: 2021-01-01
Payer: MEDICARE

## 2021-01-01 VITALS
TEMPERATURE: 98 F | HEART RATE: 90 BPM | HEIGHT: 72 IN | RESPIRATION RATE: 17 BRPM | DIASTOLIC BLOOD PRESSURE: 62 MMHG | OXYGEN SATURATION: 99 % | SYSTOLIC BLOOD PRESSURE: 116 MMHG | BODY MASS INDEX: 27.08 KG/M2 | WEIGHT: 199.94 LBS

## 2021-01-01 VITALS
HEIGHT: 72 IN | SYSTOLIC BLOOD PRESSURE: 134 MMHG | DIASTOLIC BLOOD PRESSURE: 72 MMHG | WEIGHT: 200 LBS | HEART RATE: 57 BPM | RESPIRATION RATE: 20 BRPM | TEMPERATURE: 98 F | BODY MASS INDEX: 27.09 KG/M2 | OXYGEN SATURATION: 95 %

## 2021-01-01 VITALS
OXYGEN SATURATION: 99 % | RESPIRATION RATE: 16 BRPM | SYSTOLIC BLOOD PRESSURE: 140 MMHG | DIASTOLIC BLOOD PRESSURE: 71 MMHG | HEART RATE: 84 BPM | TEMPERATURE: 98 F | HEIGHT: 74 IN | WEIGHT: 239.13 LBS | BODY MASS INDEX: 30.69 KG/M2

## 2021-01-01 DIAGNOSIS — D69.6 THROMBOCYTOPENIA (HCC): ICD-10-CM

## 2021-01-01 DIAGNOSIS — N30.00 ACUTE CYSTITIS WITHOUT HEMATURIA: ICD-10-CM

## 2021-01-01 DIAGNOSIS — N20.0 KIDNEY STONE: ICD-10-CM

## 2021-01-01 DIAGNOSIS — M51.36 DEGENERATIVE DISC DISEASE, LUMBAR: ICD-10-CM

## 2021-01-01 DIAGNOSIS — E11.65 UNCONTROLLED TYPE 2 DIABETES MELLITUS WITH HYPERGLYCEMIA (HCC): ICD-10-CM

## 2021-01-01 DIAGNOSIS — N20.1 RIGHT URETERAL STONE: ICD-10-CM

## 2021-01-01 DIAGNOSIS — R31.9 HEMATURIA, UNSPECIFIED TYPE: Primary | ICD-10-CM

## 2021-01-01 DIAGNOSIS — Z85.46 HISTORY OF PROSTATE CANCER: ICD-10-CM

## 2021-01-01 DIAGNOSIS — S39.012A LOW BACK STRAIN, INITIAL ENCOUNTER: Primary | ICD-10-CM

## 2021-01-01 DIAGNOSIS — N23 RENAL COLIC: Primary | ICD-10-CM

## 2021-01-01 DIAGNOSIS — N20.0 KIDNEY STONE: Primary | ICD-10-CM

## 2021-01-01 LAB
ALBUMIN SERPL-MCNC: 3.5 G/DL (ref 3.4–5)
ALBUMIN/GLOB SERPL: 0.9 {RATIO} (ref 1–2)
ALP LIVER SERPL-CCNC: 117 U/L
ALT SERPL-CCNC: 32 U/L
ANION GAP SERPL CALC-SCNC: 7 MMOL/L (ref 0–18)
ANION GAP SERPL CALC-SCNC: 8 MMOL/L (ref 0–18)
AST SERPL-CCNC: 26 U/L (ref 15–37)
BASOPHILS # BLD AUTO: 0.03 X10(3) UL (ref 0–0.2)
BASOPHILS # BLD AUTO: 0.04 X10(3) UL (ref 0–0.2)
BASOPHILS NFR BLD AUTO: 0.4 %
BASOPHILS NFR BLD AUTO: 0.5 %
BILIRUB SERPL-MCNC: 0.6 MG/DL (ref 0.1–2)
BILIRUB UR QL STRIP.AUTO: NEGATIVE
BUN BLD-MCNC: 27 MG/DL (ref 7–18)
BUN BLD-MCNC: 32 MG/DL (ref 7–18)
BUN/CREAT SERPL: 19.1 (ref 10–20)
BUN/CREAT SERPL: 20.1 (ref 10–20)
C DIFF TOX B STL QL: NEGATIVE
CALCIUM BLD-MCNC: 9.2 MG/DL (ref 8.5–10.1)
CALCIUM BLD-MCNC: 9.9 MG/DL (ref 8.5–10.1)
CALCULI MASS: 19 MG
CALCULI NUMBER: 2
CHLORIDE SERPL-SCNC: 105 MMOL/L (ref 98–112)
CHLORIDE SERPL-SCNC: 105 MMOL/L (ref 98–112)
CO2 SERPL-SCNC: 24 MMOL/L (ref 21–32)
CO2 SERPL-SCNC: 26 MMOL/L (ref 21–32)
COLOR UR AUTO: YELLOW
CREAT BLD-MCNC: 1.41 MG/DL
CREAT BLD-MCNC: 1.59 MG/DL
DEPRECATED RDW RBC AUTO: 49.9 FL (ref 35.1–46.3)
DEPRECATED RDW RBC AUTO: 50.1 FL (ref 35.1–46.3)
EOSINOPHIL # BLD AUTO: 0.18 X10(3) UL (ref 0–0.7)
EOSINOPHIL # BLD AUTO: 0.22 X10(3) UL (ref 0–0.7)
EOSINOPHIL NFR BLD AUTO: 2.2 %
EOSINOPHIL NFR BLD AUTO: 2.8 %
ERYTHROCYTE [DISTWIDTH] IN BLOOD BY AUTOMATED COUNT: 20.1 % (ref 11–15)
ERYTHROCYTE [DISTWIDTH] IN BLOOD BY AUTOMATED COUNT: 20.2 % (ref 11–15)
EST. AVERAGE GLUCOSE BLD GHB EST-MCNC: 143 MG/DL (ref 68–126)
GLOBULIN PLAS-MCNC: 3.7 G/DL (ref 2.8–4.4)
GLUCOSE BLD-MCNC: 127 MG/DL (ref 70–99)
GLUCOSE BLD-MCNC: 130 MG/DL (ref 70–99)
GLUCOSE BLD-MCNC: 134 MG/DL (ref 70–99)
GLUCOSE BLD-MCNC: 135 MG/DL (ref 70–99)
GLUCOSE BLD-MCNC: 138 MG/DL (ref 70–99)
GLUCOSE BLD-MCNC: 147 MG/DL (ref 70–99)
GLUCOSE BLD-MCNC: 152 MG/DL (ref 70–99)
GLUCOSE BLD-MCNC: 156 MG/DL (ref 70–99)
GLUCOSE BLD-MCNC: 161 MG/DL (ref 70–99)
GLUCOSE BLD-MCNC: 168 MG/DL (ref 70–99)
GLUCOSE BLD-MCNC: 193 MG/DL (ref 70–99)
GLUCOSE BLD-MCNC: 195 MG/DL (ref 70–99)
GLUCOSE UR STRIP.AUTO-MCNC: NEGATIVE MG/DL
HBA1C MFR BLD HPLC: 6.6 % (ref ?–5.7)
HCT VFR BLD AUTO: 33.9 %
HCT VFR BLD AUTO: 34.5 %
HGB BLD-MCNC: 10 G/DL
HGB BLD-MCNC: 9.9 G/DL
IMM GRANULOCYTES # BLD AUTO: 0.04 X10(3) UL (ref 0–1)
IMM GRANULOCYTES # BLD AUTO: 0.05 X10(3) UL (ref 0–1)
IMM GRANULOCYTES NFR BLD: 0.5 %
IMM GRANULOCYTES NFR BLD: 0.6 %
KETONES UR STRIP.AUTO-MCNC: NEGATIVE MG/DL
LYMPHOCYTES # BLD AUTO: 0.94 X10(3) UL (ref 1–4)
LYMPHOCYTES # BLD AUTO: 1.11 X10(3) UL (ref 1–4)
LYMPHOCYTES NFR BLD AUTO: 11.6 %
LYMPHOCYTES NFR BLD AUTO: 14.4 %
M PROTEIN MFR SERPL ELPH: 7.2 G/DL (ref 6.4–8.2)
MCH RBC QN AUTO: 20.7 PG (ref 26–34)
MCH RBC QN AUTO: 20.8 PG (ref 26–34)
MCHC RBC AUTO-ENTMCNC: 29 G/DL (ref 31–37)
MCHC RBC AUTO-ENTMCNC: 29.2 G/DL (ref 31–37)
MCV RBC AUTO: 71.3 FL
MCV RBC AUTO: 71.4 FL
MONOCYTES # BLD AUTO: 0.76 X10(3) UL (ref 0.1–1)
MONOCYTES # BLD AUTO: 0.8 X10(3) UL (ref 0.1–1)
MONOCYTES NFR BLD AUTO: 9.8 %
MONOCYTES NFR BLD AUTO: 9.9 %
NEUTROPHILS # BLD AUTO: 5.56 X10 (3) UL (ref 1.5–7.7)
NEUTROPHILS # BLD AUTO: 5.56 X10(3) UL (ref 1.5–7.7)
NEUTROPHILS # BLD AUTO: 6.07 X10 (3) UL (ref 1.5–7.7)
NEUTROPHILS # BLD AUTO: 6.07 X10(3) UL (ref 1.5–7.7)
NEUTROPHILS NFR BLD AUTO: 72.1 %
NEUTROPHILS NFR BLD AUTO: 75.2 %
NITRITE UR QL STRIP.AUTO: NEGATIVE
OSMOLALITY SERPL CALC.SUM OF ELEC: 294 MOSM/KG (ref 275–295)
OSMOLALITY SERPL CALC.SUM OF ELEC: 295 MOSM/KG (ref 275–295)
PH UR STRIP.AUTO: 5 [PH] (ref 4.5–8)
PLATELET # BLD AUTO: 182 10(3)UL (ref 150–450)
PLATELET # BLD AUTO: 182 10(3)UL (ref 150–450)
POTASSIUM SERPL-SCNC: 4.1 MMOL/L (ref 3.5–5.1)
POTASSIUM SERPL-SCNC: 4.2 MMOL/L (ref 3.5–5.1)
PROT UR STRIP.AUTO-MCNC: 100 MG/DL
RBC # BLD AUTO: 4.75 X10(6)UL
RBC # BLD AUTO: 4.84 X10(6)UL
RBC #/AREA URNS AUTO: >10 /HPF
SARS-COV-2 RNA RESP QL NAA+PROBE: NOT DETECTED
SODIUM SERPL-SCNC: 136 MMOL/L (ref 136–145)
SODIUM SERPL-SCNC: 139 MMOL/L (ref 136–145)
SP GR UR STRIP.AUTO: 1.03 (ref 1–1.03)
TROPONIN I SERPL-MCNC: <0.045 NG/ML (ref ?–0.04)
UROBILINOGEN UR STRIP.AUTO-MCNC: <2 MG/DL
WBC # BLD AUTO: 7.7 X10(3) UL (ref 4–11)
WBC # BLD AUTO: 8.1 X10(3) UL (ref 4–11)
WBC #/AREA URNS AUTO: >50 /HPF

## 2021-01-01 PROCEDURE — 99223 1ST HOSP IP/OBS HIGH 75: CPT | Performed by: HOSPITALIST

## 2021-01-01 PROCEDURE — 71045 X-RAY EXAM CHEST 1 VIEW: CPT | Performed by: HOSPITALIST

## 2021-01-01 PROCEDURE — 72100 X-RAY EXAM L-S SPINE 2/3 VWS: CPT | Performed by: EMERGENCY MEDICINE

## 2021-01-01 PROCEDURE — 99239 HOSP IP/OBS DSCHRG MGMT >30: CPT | Performed by: HOSPITALIST

## 2021-01-01 PROCEDURE — 71045 X-RAY EXAM CHEST 1 VIEW: CPT | Performed by: EMERGENCY MEDICINE

## 2021-01-01 PROCEDURE — 99306 1ST NF CARE HIGH MDM 50: CPT | Performed by: FAMILY MEDICINE

## 2021-01-01 PROCEDURE — 99232 SBSQ HOSP IP/OBS MODERATE 35: CPT | Performed by: CLINICAL NURSE SPECIALIST

## 2021-01-01 PROCEDURE — BT1D1ZZ FLUOROSCOPY OF RIGHT KIDNEY, URETER AND BLADDER USING LOW OSMOLAR CONTRAST: ICD-10-PCS | Performed by: UROLOGY

## 2021-01-01 PROCEDURE — 99284 EMERGENCY DEPT VISIT MOD MDM: CPT

## 2021-01-01 PROCEDURE — 99232 SBSQ HOSP IP/OBS MODERATE 35: CPT | Performed by: OTHER

## 2021-01-01 PROCEDURE — 1111F DSCHRG MED/CURRENT MED MERGE: CPT | Performed by: FAMILY MEDICINE

## 2021-01-01 PROCEDURE — 99221 1ST HOSP IP/OBS SF/LOW 40: CPT | Performed by: OTHER

## 2021-01-01 PROCEDURE — 0T768DZ DILATION OF RIGHT URETER WITH INTRALUMINAL DEVICE, VIA NATURAL OR ARTIFICIAL OPENING ENDOSCOPIC: ICD-10-PCS | Performed by: UROLOGY

## 2021-01-01 PROCEDURE — 99233 SBSQ HOSP IP/OBS HIGH 50: CPT | Performed by: HOSPITALIST

## 2021-01-01 PROCEDURE — 74177 CT ABD & PELVIS W/CONTRAST: CPT | Performed by: EMERGENCY MEDICINE

## 2021-01-01 PROCEDURE — 99232 SBSQ HOSP IP/OBS MODERATE 35: CPT | Performed by: HOSPITALIST

## 2021-01-01 PROCEDURE — 0TC68ZZ EXTIRPATION OF MATTER FROM RIGHT URETER, VIA NATURAL OR ARTIFICIAL OPENING ENDOSCOPIC: ICD-10-PCS | Performed by: UROLOGY

## 2021-01-01 PROCEDURE — 99231 SBSQ HOSP IP/OBS SF/LOW 25: CPT | Performed by: NURSE PRACTITIONER

## 2021-01-01 PROCEDURE — 74176 CT ABD & PELVIS W/O CONTRAST: CPT | Performed by: EMERGENCY MEDICINE

## 2021-01-01 PROCEDURE — 93306 TTE W/DOPPLER COMPLETE: CPT | Performed by: INTERNAL MEDICINE

## 2021-01-01 PROCEDURE — 99223 1ST HOSP IP/OBS HIGH 75: CPT | Performed by: CLINICAL NURSE SPECIALIST

## 2021-01-01 PROCEDURE — 99239 HOSP IP/OBS DSCHRG MGMT >30: CPT | Performed by: INTERNAL MEDICINE

## 2021-01-01 DEVICE — URETERAL STENT
Type: IMPLANTABLE DEVICE | Site: URETER | Status: FUNCTIONAL
Brand: ASCERTA™

## 2021-01-01 RX ORDER — FUROSEMIDE 10 MG/ML
60 INJECTION INTRAMUSCULAR; INTRAVENOUS ONCE
Status: COMPLETED | OUTPATIENT
Start: 2021-01-01 | End: 2021-01-01

## 2021-01-01 RX ORDER — HALOPERIDOL 2 MG/ML
1 SOLUTION ORAL ONCE
Status: DISCONTINUED | OUTPATIENT
Start: 2021-01-01 | End: 2021-01-01

## 2021-01-01 RX ORDER — MORPHINE SULFATE 4 MG/ML
4 INJECTION, SOLUTION INTRAMUSCULAR; INTRAVENOUS EVERY 2 HOUR PRN
Status: DISCONTINUED | OUTPATIENT
Start: 2021-01-01 | End: 2021-01-01

## 2021-01-01 RX ORDER — MORPHINE SULFATE 4 MG/ML
4 INJECTION, SOLUTION INTRAMUSCULAR; INTRAVENOUS ONCE
Status: COMPLETED | OUTPATIENT
Start: 2021-01-01 | End: 2021-01-01

## 2021-01-01 RX ORDER — DOCUSATE SODIUM 100 MG/1
100 CAPSULE, LIQUID FILLED ORAL 2 TIMES DAILY
Status: DISCONTINUED | OUTPATIENT
Start: 2021-01-01 | End: 2021-01-01

## 2021-01-01 RX ORDER — IBUPROFEN 200 MG
200 TABLET ORAL ONCE
Status: COMPLETED | OUTPATIENT
Start: 2021-01-01 | End: 2021-01-01

## 2021-01-01 RX ORDER — HYDROMORPHONE HYDROCHLORIDE 1 MG/ML
0.4 INJECTION, SOLUTION INTRAMUSCULAR; INTRAVENOUS; SUBCUTANEOUS EVERY 5 MIN PRN
Status: DISCONTINUED | OUTPATIENT
Start: 2021-01-01 | End: 2021-01-01 | Stop reason: HOSPADM

## 2021-01-01 RX ORDER — NALOXONE HYDROCHLORIDE 0.4 MG/ML
80 INJECTION, SOLUTION INTRAMUSCULAR; INTRAVENOUS; SUBCUTANEOUS AS NEEDED
Status: DISCONTINUED | OUTPATIENT
Start: 2021-01-01 | End: 2021-01-01 | Stop reason: HOSPADM

## 2021-01-01 RX ORDER — SODIUM PHOSPHATE, DIBASIC AND SODIUM PHOSPHATE, MONOBASIC 7; 19 G/133ML; G/133ML
1 ENEMA RECTAL ONCE AS NEEDED
Status: DISCONTINUED | OUTPATIENT
Start: 2021-01-01 | End: 2021-01-01

## 2021-01-01 RX ORDER — HYDROCODONE BITARTRATE AND ACETAMINOPHEN 5; 325 MG/1; MG/1
2 TABLET ORAL AS NEEDED
Status: DISCONTINUED | OUTPATIENT
Start: 2021-01-01 | End: 2021-01-01 | Stop reason: HOSPADM

## 2021-01-01 RX ORDER — ACETAMINOPHEN 325 MG/1
650 TABLET ORAL EVERY 6 HOURS PRN
Status: DISCONTINUED | OUTPATIENT
Start: 2021-01-01 | End: 2021-01-01

## 2021-01-01 RX ORDER — FUROSEMIDE 10 MG/ML
40 INJECTION INTRAMUSCULAR; INTRAVENOUS
Status: DISCONTINUED | OUTPATIENT
Start: 2021-01-01 | End: 2021-01-01

## 2021-01-01 RX ORDER — LIDOCAINE HYDROCHLORIDE 10 MG/ML
INJECTION, SOLUTION EPIDURAL; INFILTRATION; INTRACAUDAL; PERINEURAL AS NEEDED
Status: DISCONTINUED | OUTPATIENT
Start: 2021-01-01 | End: 2021-01-01 | Stop reason: SURG

## 2021-01-01 RX ORDER — LORAZEPAM 2 MG/ML
1 INJECTION INTRAMUSCULAR 2 TIMES DAILY PRN
Status: DISCONTINUED | OUTPATIENT
Start: 2021-01-01 | End: 2021-01-01

## 2021-01-01 RX ORDER — PHENAZOPYRIDINE HYDROCHLORIDE 200 MG/1
200 TABLET, FILM COATED ORAL 3 TIMES DAILY PRN
Status: DISCONTINUED | OUTPATIENT
Start: 2021-01-01 | End: 2021-01-01

## 2021-01-01 RX ORDER — MORPHINE SULFATE 2 MG/ML
1 INJECTION, SOLUTION INTRAMUSCULAR; INTRAVENOUS EVERY 2 HOUR PRN
Status: DISCONTINUED | OUTPATIENT
Start: 2021-01-01 | End: 2021-01-01

## 2021-01-01 RX ORDER — MORPHINE SULFATE 2 MG/ML
2 INJECTION, SOLUTION INTRAMUSCULAR; INTRAVENOUS EVERY 2 HOUR PRN
Status: DISCONTINUED | OUTPATIENT
Start: 2021-01-01 | End: 2021-01-01

## 2021-01-01 RX ORDER — LIDOCAINE HYDROCHLORIDE 20 MG/ML
10 JELLY TOPICAL ONCE
Status: COMPLETED | OUTPATIENT
Start: 2021-01-01 | End: 2021-01-01

## 2021-01-01 RX ORDER — ONDANSETRON 2 MG/ML
4 INJECTION INTRAMUSCULAR; INTRAVENOUS EVERY 6 HOURS PRN
Status: DISCONTINUED | OUTPATIENT
Start: 2021-01-01 | End: 2021-01-01

## 2021-01-01 RX ORDER — IPRATROPIUM BROMIDE AND ALBUTEROL SULFATE 2.5; .5 MG/3ML; MG/3ML
3 SOLUTION RESPIRATORY (INHALATION) EVERY 4 HOURS PRN
Status: DISCONTINUED | OUTPATIENT
Start: 2021-01-01 | End: 2021-01-01

## 2021-01-01 RX ORDER — DEXTROSE MONOHYDRATE 25 G/50ML
50 INJECTION, SOLUTION INTRAVENOUS
Status: DISCONTINUED | OUTPATIENT
Start: 2021-01-01 | End: 2021-01-01

## 2021-01-01 RX ORDER — HALOPERIDOL 5 MG/ML
1 INJECTION INTRAMUSCULAR ONCE
Status: COMPLETED | OUTPATIENT
Start: 2021-01-01 | End: 2021-01-01

## 2021-01-01 RX ORDER — GABAPENTIN 100 MG/1
100 CAPSULE ORAL NIGHTLY
Status: DISCONTINUED | OUTPATIENT
Start: 2021-01-01 | End: 2021-01-01

## 2021-01-01 RX ORDER — CEFAZOLIN SODIUM/WATER 2 G/20 ML
SYRINGE (ML) INTRAVENOUS AS NEEDED
Status: DISCONTINUED | OUTPATIENT
Start: 2021-01-01 | End: 2021-01-01 | Stop reason: SURG

## 2021-01-01 RX ORDER — MAGNESIUM HYDROXIDE 1200 MG/15ML
3000 LIQUID ORAL CONTINUOUS
Status: DISCONTINUED | OUTPATIENT
Start: 2021-01-01 | End: 2021-01-01

## 2021-01-01 RX ORDER — FUROSEMIDE 20 MG/1
20 TABLET ORAL DAILY
Status: DISCONTINUED | OUTPATIENT
Start: 2021-01-01 | End: 2021-01-01

## 2021-01-01 RX ORDER — METOCLOPRAMIDE HYDROCHLORIDE 5 MG/ML
5 INJECTION INTRAMUSCULAR; INTRAVENOUS EVERY 8 HOURS PRN
Status: DISCONTINUED | OUTPATIENT
Start: 2021-01-01 | End: 2021-01-01

## 2021-01-01 RX ORDER — FUROSEMIDE 20 MG/1
20 TABLET ORAL DAILY
Qty: 30 TABLET | Refills: 0 | Status: SHIPPED | OUTPATIENT
Start: 2021-01-01

## 2021-01-01 RX ORDER — ACETAMINOPHEN 500 MG
500 TABLET ORAL ONCE
Status: COMPLETED | OUTPATIENT
Start: 2021-01-01 | End: 2021-01-01

## 2021-01-01 RX ORDER — PREDNISONE 20 MG/1
20 TABLET ORAL
Status: DISCONTINUED | OUTPATIENT
Start: 2021-01-01 | End: 2021-01-01

## 2021-01-01 RX ORDER — DEXTROSE MONOHYDRATE 25 G/50ML
50 INJECTION, SOLUTION INTRAVENOUS
Status: DISCONTINUED | OUTPATIENT
Start: 2021-01-01 | End: 2021-01-01 | Stop reason: HOSPADM

## 2021-01-01 RX ORDER — HALOPERIDOL 5 MG/ML
1 INJECTION INTRAMUSCULAR ONCE
Status: DISCONTINUED | OUTPATIENT
Start: 2021-01-01 | End: 2021-01-01

## 2021-01-01 RX ORDER — SODIUM CHLORIDE, SODIUM LACTATE, POTASSIUM CHLORIDE, CALCIUM CHLORIDE 600; 310; 30; 20 MG/100ML; MG/100ML; MG/100ML; MG/100ML
INJECTION, SOLUTION INTRAVENOUS CONTINUOUS
Status: DISCONTINUED | OUTPATIENT
Start: 2021-01-01 | End: 2021-01-01 | Stop reason: HOSPADM

## 2021-01-01 RX ORDER — PREDNISONE 20 MG/1
20 TABLET ORAL
Qty: 1 TABLET | Refills: 0 | Status: SHIPPED | OUTPATIENT
Start: 2021-01-01 | End: 2021-01-01

## 2021-01-01 RX ORDER — ECHINACEA PURPUREA EXTRACT 125 MG
1 TABLET ORAL
Status: DISCONTINUED | OUTPATIENT
Start: 2021-01-01 | End: 2021-01-01

## 2021-01-01 RX ORDER — COLCHICINE 0.6 MG/1
0.6 TABLET ORAL DAILY
Status: DISCONTINUED | OUTPATIENT
Start: 2021-01-01 | End: 2021-01-01

## 2021-01-01 RX ORDER — CEPHALEXIN 500 MG/1
500 CAPSULE ORAL 3 TIMES DAILY
Qty: 9 CAPSULE | Refills: 0 | Status: ON HOLD | OUTPATIENT
Start: 2021-01-01 | End: 2021-01-01

## 2021-01-01 RX ORDER — HYDROCODONE BITARTRATE AND ACETAMINOPHEN 5; 325 MG/1; MG/1
1 TABLET ORAL EVERY 4 HOURS PRN
Status: DISCONTINUED | OUTPATIENT
Start: 2021-01-01 | End: 2021-01-01

## 2021-01-01 RX ORDER — SODIUM CHLORIDE 9 MG/ML
INJECTION, SOLUTION INTRAVENOUS CONTINUOUS
Status: DISCONTINUED | OUTPATIENT
Start: 2021-01-01 | End: 2021-01-01

## 2021-01-01 RX ORDER — HALOPERIDOL 5 MG/ML
2 INJECTION INTRAMUSCULAR ONCE
Status: COMPLETED | OUTPATIENT
Start: 2021-01-01 | End: 2021-01-01

## 2021-01-01 RX ORDER — OXYBUTYNIN CHLORIDE 5 MG/1
5 TABLET, EXTENDED RELEASE ORAL EVERY MORNING
Status: DISCONTINUED | OUTPATIENT
Start: 2021-01-01 | End: 2021-01-01

## 2021-01-01 RX ORDER — FUROSEMIDE 40 MG/1
40 TABLET ORAL
Status: DISCONTINUED | OUTPATIENT
Start: 2021-01-01 | End: 2021-01-01

## 2021-01-01 RX ORDER — BISACODYL 10 MG
10 SUPPOSITORY, RECTAL RECTAL
Status: DISCONTINUED | OUTPATIENT
Start: 2021-01-01 | End: 2021-01-01

## 2021-01-01 RX ORDER — DONEPEZIL HYDROCHLORIDE 5 MG/1
5 TABLET, FILM COATED ORAL NIGHTLY
Status: DISCONTINUED | OUTPATIENT
Start: 2021-01-01 | End: 2021-01-01

## 2021-01-01 RX ORDER — MORPHINE SULFATE 4 MG/ML
4 INJECTION, SOLUTION INTRAMUSCULAR; INTRAVENOUS ONCE
Status: DISCONTINUED | OUTPATIENT
Start: 2021-01-01 | End: 2021-01-01

## 2021-01-01 RX ORDER — ATROPA BELLADONNA AND OPIUM 16.2; 6 MG/1; MG/1
1 SUPPOSITORY RECTAL EVERY 6 HOURS PRN
Status: DISCONTINUED | OUTPATIENT
Start: 2021-01-01 | End: 2021-01-01

## 2021-01-01 RX ORDER — POLYETHYLENE GLYCOL 3350 17 G/17G
17 POWDER, FOR SOLUTION ORAL DAILY PRN
Status: DISCONTINUED | OUTPATIENT
Start: 2021-01-01 | End: 2021-01-01

## 2021-01-01 RX ORDER — COLCHICINE 0.6 MG/1
0.6 TABLET ORAL DAILY
Qty: 5 TABLET | Refills: 0 | Status: SHIPPED | OUTPATIENT
Start: 2021-01-01 | End: 2021-01-01

## 2021-01-01 RX ORDER — LIDOCAINE HYDROCHLORIDE 20 MG/ML
JELLY TOPICAL AS NEEDED
Status: DISCONTINUED | OUTPATIENT
Start: 2021-01-01 | End: 2021-01-01 | Stop reason: HOSPADM

## 2021-01-01 RX ORDER — HYDROCODONE BITARTRATE AND ACETAMINOPHEN 5; 325 MG/1; MG/1
2 TABLET ORAL EVERY 4 HOURS PRN
Status: DISCONTINUED | OUTPATIENT
Start: 2021-01-01 | End: 2021-01-01

## 2021-01-01 RX ORDER — METOCLOPRAMIDE HYDROCHLORIDE 5 MG/ML
10 INJECTION INTRAMUSCULAR; INTRAVENOUS EVERY 8 HOURS PRN
Status: DISCONTINUED | OUTPATIENT
Start: 2021-01-01 | End: 2021-01-01

## 2021-01-01 RX ORDER — DONEPEZIL HYDROCHLORIDE 5 MG/1
5 TABLET, FILM COATED ORAL NIGHTLY
Qty: 30 TABLET | Refills: 0 | Status: SHIPPED | OUTPATIENT
Start: 2021-01-01

## 2021-01-01 RX ORDER — ONDANSETRON 2 MG/ML
4 INJECTION INTRAMUSCULAR; INTRAVENOUS AS NEEDED
Status: DISCONTINUED | OUTPATIENT
Start: 2021-01-01 | End: 2021-01-01 | Stop reason: HOSPADM

## 2021-01-01 RX ORDER — HALOPERIDOL 5 MG/ML
INJECTION INTRAMUSCULAR
Status: COMPLETED
Start: 2021-01-01 | End: 2021-01-01

## 2021-01-01 RX ORDER — DEXAMETHASONE SODIUM PHOSPHATE 4 MG/ML
VIAL (ML) INJECTION AS NEEDED
Status: DISCONTINUED | OUTPATIENT
Start: 2021-01-01 | End: 2021-01-01 | Stop reason: SURG

## 2021-01-01 RX ORDER — ONDANSETRON 2 MG/ML
INJECTION INTRAMUSCULAR; INTRAVENOUS AS NEEDED
Status: DISCONTINUED | OUTPATIENT
Start: 2021-01-01 | End: 2021-01-01 | Stop reason: SURG

## 2021-01-01 RX ORDER — LORAZEPAM 2 MG/ML
1 INJECTION INTRAMUSCULAR EVERY 6 HOURS PRN
Status: DISCONTINUED | OUTPATIENT
Start: 2021-01-01 | End: 2021-01-01

## 2021-01-01 RX ORDER — ACETAMINOPHEN 500 MG
1000 TABLET ORAL EVERY 6 HOURS PRN
Status: DISCONTINUED | OUTPATIENT
Start: 2021-01-01 | End: 2021-01-01

## 2021-01-01 RX ORDER — DEXTROSE AND SODIUM CHLORIDE 5; .45 G/100ML; G/100ML
INJECTION, SOLUTION INTRAVENOUS CONTINUOUS
Status: ACTIVE | OUTPATIENT
Start: 2021-01-01 | End: 2021-01-01

## 2021-01-01 RX ORDER — SODIUM CHLORIDE 9 MG/ML
125 INJECTION, SOLUTION INTRAVENOUS CONTINUOUS
Status: DISCONTINUED | OUTPATIENT
Start: 2021-01-01 | End: 2021-01-01

## 2021-01-01 RX ORDER — HYDROCODONE BITARTRATE AND ACETAMINOPHEN 5; 325 MG/1; MG/1
1 TABLET ORAL AS NEEDED
Status: DISCONTINUED | OUTPATIENT
Start: 2021-01-01 | End: 2021-01-01 | Stop reason: HOSPADM

## 2021-01-01 RX ORDER — ACETAMINOPHEN 325 MG/1
650 TABLET ORAL EVERY 4 HOURS PRN
Status: DISCONTINUED | OUTPATIENT
Start: 2021-01-01 | End: 2021-01-01

## 2021-01-01 RX ADMIN — ONDANSETRON 4 MG: 2 INJECTION INTRAMUSCULAR; INTRAVENOUS at 16:00:00

## 2021-01-01 RX ADMIN — DEXAMETHASONE SODIUM PHOSPHATE 4 MG: 4 MG/ML VIAL (ML) INJECTION at 16:00:00

## 2021-01-01 RX ADMIN — SODIUM CHLORIDE: 9 INJECTION, SOLUTION INTRAVENOUS at 16:07:00

## 2021-01-01 RX ADMIN — SODIUM CHLORIDE: 9 INJECTION, SOLUTION INTRAVENOUS at 16:39:00

## 2021-01-01 RX ADMIN — LIDOCAINE HYDROCHLORIDE 100 MG: 10 INJECTION, SOLUTION EPIDURAL; INFILTRATION; INTRACAUDAL; PERINEURAL at 15:53:00

## 2021-01-01 RX ADMIN — CEFAZOLIN SODIUM/WATER 2 G: 2 G/20 ML SYRINGE (ML) INTRAVENOUS at 15:58:00

## 2021-01-01 RX ADMIN — SODIUM CHLORIDE: 9 INJECTION, SOLUTION INTRAVENOUS at 16:38:00

## 2021-02-05 PROBLEM — Z85.46 HISTORY OF PROSTATE CANCER: Status: ACTIVE | Noted: 2021-01-01

## 2021-02-05 PROBLEM — Z85.51 HISTORY OF BLADDER CANCER: Status: ACTIVE | Noted: 2021-01-01

## 2021-02-05 PROBLEM — N30.40 RADIATION CYSTITIS: Status: ACTIVE | Noted: 2021-01-01

## 2021-02-05 PROBLEM — R31.9 HEMATURIA, UNSPECIFIED TYPE: Status: RESOLVED | Noted: 2020-09-06 | Resolved: 2021-01-01

## 2021-02-06 PROBLEM — R19.7 NAUSEA VOMITING AND DIARRHEA: Status: RESOLVED | Noted: 2017-02-28 | Resolved: 2021-01-01

## 2021-02-06 PROBLEM — E87.1 HYPONATREMIA: Status: RESOLVED | Noted: 2017-02-28 | Resolved: 2021-01-01

## 2021-02-06 PROBLEM — R31.0 GROSS HEMATURIA: Status: RESOLVED | Noted: 2020-10-30 | Resolved: 2021-01-01

## 2021-02-06 PROBLEM — E86.0 DEHYDRATION: Status: RESOLVED | Noted: 2017-02-28 | Resolved: 2021-01-01

## 2021-02-06 PROBLEM — R11.2 NAUSEA VOMITING AND DIARRHEA: Status: RESOLVED | Noted: 2017-02-28 | Resolved: 2021-01-01

## 2021-02-06 PROBLEM — N39.0 URINARY TRACT INFECTION ASSOCIATED WITH INDWELLING URETHRAL CATHETER, INITIAL ENCOUNTER (HCC): Status: RESOLVED | Noted: 2020-03-05 | Resolved: 2021-01-01

## 2021-02-06 PROBLEM — C67.1 CANCER OF DOME OF URINARY BLADDER (HCC): Status: ACTIVE | Noted: 2021-01-01

## 2021-02-06 PROBLEM — N28.9 ACUTE RENAL INSUFFICIENCY: Status: RESOLVED | Noted: 2020-03-05 | Resolved: 2021-01-01

## 2021-02-06 PROBLEM — T83.511A URINARY TRACT INFECTION ASSOCIATED WITH INDWELLING URETHRAL CATHETER, INITIAL ENCOUNTER (HCC): Status: RESOLVED | Noted: 2020-03-05 | Resolved: 2021-01-01

## 2021-02-06 PROBLEM — N39.0 URINARY TRACT INFECTION ASSOCIATED WITH INDWELLING URETHRAL CATHETER (HCC): Status: RESOLVED | Noted: 2020-03-04 | Resolved: 2021-01-01

## 2021-02-06 PROBLEM — T83.511A URINARY TRACT INFECTION ASSOCIATED WITH INDWELLING URETHRAL CATHETER (HCC): Status: RESOLVED | Noted: 2020-03-04 | Resolved: 2021-01-01

## 2021-02-06 NOTE — ED PROVIDER NOTES
Patient Seen in: [facility] Emergency Department      History   Patient presents with:  Back Pain    Stated Complaint: BACK PAIN    HPI/Subjective:   HPI    This is a pleasant 22-year-old male who presents to the ER via EMS due to report of right lo bilateral                Social History    Tobacco Use      Smoking status: Former Smoker        Years: 30.00        Quit date: 1986        Years since quittin.1      Smokeless tobacco: Never Used    Alcohol use: No    Drug use:  No             Rev HISTORY: (As transcribed by Technologist) Kamila Hermosillo shares that he has been having lower right back pain for the past three weeks and has no known injury.          FINDINGS:       There are stable postoperative changes of posterior spinal fusion at L4-5.

## 2021-02-07 PROBLEM — N23 RENAL COLIC: Status: ACTIVE | Noted: 2021-01-01

## 2021-02-07 PROBLEM — N20.0 KIDNEY STONE: Status: ACTIVE | Noted: 2021-01-01

## 2021-02-07 PROBLEM — D64.9 ANEMIA: Status: ACTIVE | Noted: 2021-01-01

## 2021-02-07 PROBLEM — R73.9 HYPERGLYCEMIA: Status: ACTIVE | Noted: 2021-01-01

## 2021-02-07 PROBLEM — N17.9 ACUTE KIDNEY INJURY (HCC): Status: ACTIVE | Noted: 2021-01-01

## 2021-02-07 PROBLEM — N30.00 ACUTE CYSTITIS WITHOUT HEMATURIA: Status: ACTIVE | Noted: 2021-01-01

## 2021-02-07 NOTE — PROGRESS NOTES
Pharmacy Note: Renal dose adjustment for Metoclopramide (Reglan)  Reji Lucero has been prescribed Metoclopramide (Reglan) 10 mg every 8 hours as needed. Estimated Creatinine Clearance: 35.9 mL/min (A) (based on SCr of 1.59 mg/dL (H)).     His calculate

## 2021-02-07 NOTE — CONSULTS
[facility]  Report of Consultation    Mihaela Hill Patient Status:  Observation    1933 MRN [id number]   [facility] 3NW-A Attending Luna Lujan, DO   Hosp Day # 0 PCP Evgeny Soriano     Reason for Consultation: radiation   • Sleep apnea    • Spinal stenosis    • Visual impairment     macular degeneration     Past Surgical History:   Procedure Laterality Date   • BACK SURGERY     • CHOLECYSTECTOMY     • CYSTOSCOPY TRANSURETHRAL RESECTION BLADDER TUMOR N/A 10/30 morphINE sulfate (PF) 4 MG/ML injection 4 mg, 4 mg, Intravenous, Q2H PRN  •  docusate sodium (COLACE) cap 100 mg, 100 mg, Oral, BID  •  PEG 3350 (MIRALAX) powder packet 17 g, 17 g, Oral, Daily PRN  •  magnesium hydroxide (MILK OF MAGNESIA) 400 MG/5ML suspe 02/06/2021    LEUUR Large (A) 02/06/2021    WBCUR >50 (A) 02/06/2021    RBCUR >10 (A) 02/06/2021    BACUR Rare (A) 02/06/2021    EPIUR None Seen 02/06/2021    RENUR None Seen 02/06/2021    TRANSUR None Seen 02/06/2021    MUCUR 1+ (A) 02/06/2021    YEASTUR stricture/radiation cystitis  Abnormal UA with microhematuria/pyuria      Recommendations:  Discussed with daughter about surgical options.   Prior in the office they were concerned about repeated GA's for their father with his progressing dementia  Reviewe

## 2021-02-07 NOTE — ED NOTES
Per md communication, pt able to have food and liquids, pt provided sandwich and water , family updated

## 2021-02-07 NOTE — ANESTHESIA POSTPROCEDURE EVALUATION
[address] Patient Status:  Inpatient   Age/Gender 80year old male MRN [id number]   Location [address] [facility] Attending Kavita Dove, [address] [facility] Day # 0 PCP Hussein IBARRA       Anesthesia Post-o

## 2021-02-07 NOTE — ANESTHESIA PREPROCEDURE EVALUATION
PRE-OP EVALUATION    Patient Name: Jean-Pierre Smith    Pre-op Diagnosis: Right ureteral stone [N20.1]    Procedure(s):  CYSTOSCOPY,  URETEROSCOPY, RETROGRADE, PYELOGRAM, STONE MANIPULATION WITH HOLMIUM LASER AND INSERTION RIGHT URETERAL STENT    Surgeon(s) an (FLEET) 7-19 GM/118ML enema 133 mL, 1 enema, Rectal, Once PRN    •  ondansetron HCl (ZOFRAN) injection 4 mg, 4 mg, Intravenous, Q6H PRN    •  Metoclopramide HCl (REGLAN) injection 5 mg, 5 mg, Intravenous, Q8H PRN    •  Insulin Aspart Pen (NOVOLOG) 100 UNIT History:   Procedure Laterality Date   • BACK SURGERY     • CHOLECYSTECTOMY     • CYSTOSCOPY TRANSURETHRAL RESECTION BLADDER TUMOR N/A 10/30/2020    Performed by Peña Mi MD at [facility] MAIN OR   • CYSTOURETHROSCOPY  02/05/2021    office BTS cysto, radi pain, nausea/vomiting were discussed with patient. Risks of general anesthetic include but not are not limited to adverse reactions related to medications administered, dental damage, and sore throat postoperatively.   Questions were answered and consent w

## 2021-02-07 NOTE — H&P
АЛЕКСАНДР HOSPITALIST  History and Physical     [facility] Patient Status:  Observation    1933 MRN [id number]   [facility] 3NW-A Attending Martin Wilcox MD   Hosp Day # 0 PCP TITA HAYES [address] [facility]     Chief Complaint: abdominal p Social History:  reports that he quit smoking about 35 years ago. He quit after 30.00 years of use. He has never used smokeless tobacco. He reports that he does not drink alcohol or use drugs. Family History: No family history on file.      Nanda Church Appropriate mood and affect.       Diagnostic Data:      Labs:  Recent Labs   Lab 02/06/21  2230   WBC 8.1   HGB 10.0*   MCV 71.3*   .0       Recent Labs   Lab 02/06/21  2230   *   BUN 32*   CREATSERUM 1.59*   GFRAA 44*   GFRNAA 38*   CA 9.2

## 2021-02-07 NOTE — PLAN OF CARE
Pt is A&O, VSS, [place]- No hearing aids, Visual Impairment- no glasses, and denies pain. Daughter at bedside reviewing Admission. Pt is on RA with bilateral diminished lung sounds. YUMI- no CPAP. Abdomen is round, soft, and tender with active bowel sounds.  Voi

## 2021-02-07 NOTE — ED INITIAL ASSESSMENT (HPI)
80year old male arrived via ems. Recently discharged from the ED yesterday. Today when he woke up noticed some diffuse abdomen pain across his entire belly.  A&Ox4, denies any N/V/D but smaller than normal bowel movements,

## 2021-02-07 NOTE — ED PROVIDER NOTES
Patient Seen in: [facility] Emergency Department      History   Patient presents with:  Abdomen/Flank Pain    Stated Complaint: abdomen pain, for the last 12 hous, since he woke up.      HPI/Subjective:   HPI    80-year-old male presents emergency de Social History    Tobacco Use      Smoking status: Former Smoker        Years: 30.00        Quit date: 1986        Years since quittin.1      Smokeless tobacco: Never Used    Alcohol use: No    Drug use:  No             Review of Systems    P sounds are present , no pulsatile mass  Extremities: No clubbing cyanosis or edema 2+ distal pulses. Neuro: Cranial nerves II through XII intact with no gross focal sensory or motor abnormality.       ED Course     Labs Reviewed   COMP METABOLIC PANEL (14) would place a [place]. Xr Lumbar Spine (min 2 Views) (cpt=72100)    Result Date: 2/6/2021  PROCEDURE:  XR LUMBAR SPINE (MIN 2 VIEWS) (CPT=72100)  TECHNIQUE:  AP, lateral, and coned down L5-S1 views were obtained.   COMPARISON:  EDWARD , XR, XR LUMBAR SPI BILIARY:  Cholecystectomy. No visible dilatation or calcification. PANCREAS:  No lesion, fluid collection, ductal dilatation, or atrophy. SPLEEN:  No enlargement or focal lesion.   KIDNEYS:  Moderate right hydronephrosis and diffuse hydroureter to the le probable infection IV antibiotics were started.   Patient will be admitted for pain control and urology appointment in the morning    MDM      Patient was evaluated in the emergency department and at this point patient will need admission for further manage

## 2021-02-07 NOTE — BRIEF OP NOTE
Pre-Operative Diagnosis: Right ureteral stone [N20.1]     Post-Operative Diagnosis: Right ureteral stone [N20.1]   Bladder neck contracture  History of bladder cancer  History of prostate cancer     Procedure Performed:   Procedure(s):  Michelle Berrios

## 2021-02-07 NOTE — PLAN OF CARE
Pt a&o x4.   Cheerful & cooperative w/ care  H/o dementia  Bed alarm in place  Gilbert precautions  Maintaining sats on ra  Tele = nsr  Npo for or  Straining all urine  Ivf's  Up w/ sba & walker  Poc discussed w/ daughter Gabriel Solo'          Problem: Diabete

## 2021-02-07 NOTE — PROGRESS NOTES
Severa Sakai [phone], daughter      Spoke with pt's daughter, Cristian Zapien. Cristian Ham is very involved & helpful with plan of care. Cristian Curry states pt does not have a power of .   She also states that her brother & sister are also involved in pt's care

## 2021-02-08 NOTE — CM/SW NOTE
02/08/21 1600   Discharge disposition   Expected discharge disposition Skilled Nurs   Name of Facillity/Home Care/Hospice [facility]   Discharge transportation [address] [facility]

## 2021-02-08 NOTE — PLAN OF CARE
Pt has had 3 soft to mushy and loose stools today, stool sample sent  To test for c-diff as per protocol. 4

## 2021-02-08 NOTE — PROGRESS NOTES
[facility]  Urology Progress Note    Gladys Perez Patient Status:  Inpatient    1933 MRN [id number]   [facility] 3NW-A Attending Latoya Espino, DO   Hosp Day # 1 PCP Hector Garcia     Subjective:  Gladys Perez is

## 2021-02-08 NOTE — DISCHARGE SUMMARY
АЛЕКСАНДР HOSPITALIST  DISCHARGE SUMMARY     Madalyn Garrison Patient Status:  Inpatient    1933 MRN [id number]   [facility] 3NW-A Attending No att. providers found   Hosp Day # 1 PCP TITA IBARRA     Date of Admission: 2021  D hospitalization but patient improved faster than expected.                Procedures during hospitalization:   • 2/7 - cystoscopy/R ureteroscopy, extraction of stone and R ureteral stent placement    Incidental or significant findings and recommendations (b monitoring    Appointments for Next 30 Days 2/8/2021 - 3/10/2021    None          Vital signs:  Temp:  [97.4 °F (36.3 °C)-97.9 °F (36.6 °C)] 97.8 °F (36.6 °C)  Pulse:  [57-91] 90  Resp:  [16-25] 17  BP: (109-129)/(59-84) 116/62    Physical Exam:    General

## 2021-02-08 NOTE — CM/SW NOTE
Per Della Alaniz with [facility] they can accept pt under Medicare waiver. Per staff, pt stating he does not want to go to rehab, wants to go home with his wife.      I contacted pts daughter Anaid Curtis who requested he go to [facility] to ask if she has spoken with

## 2021-02-08 NOTE — PLAN OF CARE
Pt left SCU via [facility]jia Muff and a wheelchair. No belongings with him, per daughter Evan Toledo she has his clothes and will bring them to him at [facility]. PIV removed, catheter was intact. Paperwork sent with pt. Report called to [address] [facility] at [facility].  Per RN th

## 2021-02-08 NOTE — OCCUPATIONAL THERAPY NOTE
OCCUPATIONAL THERAPY EVALUATION - INPATIENT     Room Number: 323/323-A  Evaluation Date: 2/8/2021  Type of Evaluation: Initial  Presenting Problem: cystoscopy    Physician Order: IP Consult to Occupational Therapy  Reason for Therapy: ADL/IADL Dysfunction SURGERY     • RADIATION      prostate surgery   • REMOVAL GALLBLADDER     • TOTAL KNEE REPLACEMENT      bilateral       OCCUPATIONAL PROFILE    HOME SITUATION  Type of Home: Independent living facility  Home Layout: One level  Lives With: Spouse    Toilet which includes using toilet, bedpan or urinal? : A Little  -   Putting on and taking off regular upper body clothing?: A Little  -   Taking care of personal grooming such as brushing teeth?: A Little  -   Eating meals?: A Little    AM-PAC Score:  Score: 16 impacting engagement in ADL/IADL MODERATE  3 - 5 performance deficits   Client Assessment/Performance Deficits MODERATE - Comorbidities and min to mod modifications of tasks    Clinical Decision Making MODERATE - Analysis of occupational profile, detailed

## 2021-02-08 NOTE — PHYSICAL THERAPY NOTE
PHYSICAL THERAPY EVALUATION - INPATIENT     Room Number: 323/323-A  Evaluation Date: 2/8/2021  Type of Evaluation: Initial  Physician Order: PT Eval and Treat    Presenting Problem: s/p cystoscopy, urethral dilation, right ureteroscopy, irrigation an 02/05/2021    office BTS cysto, radiation scarring, small stones in the bladder   • CYSTOURETHROSCOPY W/ IRRIGATION & EVACUATION CLOTS  10/30/2020    CYSTOSCOPY, CLOT EVACUATION, FULGURATION BLADDER TUMOR    • CYSTOURETHROSCOPY,FULGUR 2-5CM JOHNN  03/02/202 Little   How much help from another person does the patient currently need. ..   -   Moving to and from a bed to a chair (including a wheelchair)?: A Little   -   Need to walk in hospital room?: A Little   -   Climbing 3-5 steps with a railing?: A Little manifest themselves as functional limitations in independent bed mobility, transfers, and gait.   The patient is below baseline and would benefit from skilled inpatient PT to address the above deficits to assist patient in returning to prior to level of fun

## 2021-02-08 NOTE — PLAN OF CARE
Pt had one more loose stool, larger this time. C-diff is negative. Bladder scanner performed to check for retention as per [facility]'s order, 0 ml found.

## 2021-02-08 NOTE — PLAN OF CARE
Upon assessment pt is A&Ox3 but can be forgetful; re-orientation needed at times. Fall precautions in place; bed alarm/chair alarm on at all times, bed in low position, call light always in reach. Pt denies nausea, pain, seth/sob.  Pt on room air, pulse ox o

## 2021-02-08 NOTE — OPERATIVE REPORT
[facility]    PATIENT'S NAME: Miguel A Alexander NACHO   ATTENDING PHYSICIAN: Nik Peterson DO   OPERATING PHYSICIAN: Sherren Person, M.D.    PATIENT ACCOUNT#:   [de-identified]    LOCATION:  [address] [place] [address]  MEDICAL RECORD #:   [id number]       YOB: 1933  A marked. General anesthesia was induced without difficulty. His legs were placed in the dorsal lithotomy position, padded and secured, and his penoscrotal area prepped and draped in usual sterile fashion.    imaging by fluoroscopy was limited due to t ureterovesical junction there was a stone seen. I was able to grab it with a basket and retrieve it out. Repeat endoscopy of the distal ureter showed normal caliber ureter tissue above where the stone was impacted.   There were, however, some likely pale

## 2021-02-08 NOTE — CM/SW NOTE
02/08/21 1100   CM/SW Referral Data   Referral Source    Reason for Referral Discharge planning   Informant Children   Pertinent Medical Hx   Primary Care Physician Name Ming Cruz   Patient Info   Patient's Mental Status Alert;Memor

## 2021-02-08 NOTE — PLAN OF CARE
A&Ox4, confused at times, easy to reorient, lungs clear bilaterally, diminished in bases, pt encouraged to cough/deep breath, on RA. Abd is soft, non-distended, pt denies tenderness. Pt states passing gas, denies nausea.  Voiding via beside urinal producing

## 2021-02-08 NOTE — PROGRESS NOTES
АЛЕКСАНДР HOSPITALIST  Progress Note     Michela Mo Patient Status:  Inpatient    1933 MRN [id number]   [facility] 3NW-A Attending Kevin Connelly, [address] [facility] Day # 1 PCP Domi IBARRA     Chief Complaint: abdominal/R flan for input(s): PTP, INR in the last 168 hours. Recent Labs   Lab 02/06/21  2230   TROP <0.045            Imaging: Imaging data reviewed in Epic.     Medications:   • gabapentin  100 mg Oral Nightly   • Oxybutynin Chloride ER  5 mg Oral QAM   • cefTRIAXone

## 2021-02-10 PROBLEM — R45.851 SUICIDAL IDEATION: Status: ACTIVE | Noted: 2021-01-01

## 2021-02-10 PROBLEM — R60.9 PERIPHERAL EDEMA: Status: ACTIVE | Noted: 2021-01-01

## 2021-02-10 NOTE — ED INITIAL ASSESSMENT (HPI)
Pt to ED via EMS from [facility] for SI. Pt presents with a petition from [facility] that sts pt made a suicidal statement but does not have a plan. Pt denies any thoughts of suicide. A/Ox3.

## 2021-02-10 NOTE — ED NOTES
Round on pt. Pt becoming agitated. Sts \"I need to get out of here. Why do you keep doing this to me? Please I need to get out of here. \" Pt reoriented to surroundings. MD updated.

## 2021-02-10 NOTE — ED PROVIDER NOTES
Patient Seen in: [facility] Emergency Department      History   Patient presents with:  Eval-P    Stated Complaint: SI from [facility]    HPI/Subjective:   HPI    Patient is an 40-year-old male comes emergency room for evaluation of suicidal ideatio Pupils equal round reactive to light and accommodation, extraocular motion is intact, sclerae white, conjunctiva is pink. Oropharynx is unremarkable, no exudate. NECK: Supple, trachea midline, no lymphadenopathy.   No JVD  LUNG: Patient with slight crackl Lymphocyte Absolute 0.82 (*)     All other components within normal limits   TROPONIN I - Normal   PRO BETA NATRIURETIC PEPTIDE - Normal   ETHYL ALCOHOL - Normal   CBC WITH DIFFERENTIAL WITH PLATELET    Narrative:      The following orders were created for Patient does not remember making the statements to his daughter. Patient does exhibit some peripheral edema which is likely due to venous insufficiency. Chest x-ray shows no evidence for heart failure.   His BNP is normal.  I did order 20 mg of Lasix for

## 2021-02-10 NOTE — ED NOTES
Spoke to [employer] at [employer]. Sts pt was speaking to his daughter at 0800 this am and told her he wanted to die. Daughter called nursing home to inform RN.  Per [facility] RN, she went into pt room and pt stated \"Let me die\" and became agitated and

## 2021-02-10 NOTE — H&P
АЛЕКСАНДР HOSPITALIST  History and Physical     Dhara Sarmiento Patient Status:  Emergency    1933 MRN [id number]   Location [address] [facility] Attending Amie Sanchez MD   Hosp Day # 0 PCP TITA Donalddinora ideations. He states he only made those remarks because he did not want to be at rehab.     Past Medical History:  Past Medical History:   Diagnosis Date   • Arthritis     left hip   • Bladder cancer ([facility] 03/2020    TURBT, high grade superficial bladder c Allergies    Medications:  No current facility-administered medications on file prior to encounter. •  cephALEXin 500 MG Oral Cap, Take 1 capsule (500 mg total) by mouth 3 (three) times daily for 3 days. , Disp: 9 capsule, Rfl: 0    •  Trospium Chloride ideation.     Diagnostic Data:      Labs:  Recent Labs   Lab 02/06/21 2230 02/07/21  0752 02/10/21  1019   WBC 8.1 7.7 5.9   HGB 10.0* 9.9* 9.9*   MCV 71.3* 71.4* 72.2*   .0 182.0 208.0     Recent Labs   Lab 02/06/21 2230 02/07/21  0752 02/10/21  1 Full  · Oconnor: No  · If COVID testing is negative, may discontinue isolation: Yes     Plan of care discussed with patient, RN.     Lester Kocher,   2/10/2021

## 2021-02-10 NOTE — ED NOTES
Spoke with pt's daughter, Sangita Salinas. [phone]. Requests to be called when [facility] assesses pt.

## 2021-02-10 NOTE — TELEPHONE ENCOUNTER
RN staff notified this provider that the patient's daughter called crying stating that her father agitated and wanting to kill himself. The RN assessed the patient. He was agitated, throwing things and stated he wanted to kill himself.   He did not have a

## 2021-02-10 NOTE — ED NOTES
MD at bedside.   Gauri Current PCT sitter for pt, notified   [facility] notified of assessment

## 2021-02-10 NOTE — ED NOTES
Pt sts he needs to have a BM. Pt assisted to [facility]. Pt unsteady. Requires 2 assist. Moderate BM noted.

## 2021-02-10 NOTE — ED NOTES
Pt had to be redirected multiple times, yelling at staff outside room and stating she wants the curtains closed.  Explained the curtains cannot be closed due to safety reasons  TV turned on, food provided, redirected to stay in bed

## 2021-02-10 NOTE — ED NOTES
Pt attempting to get out of bed. Sts he needs to use the urinal. 3 staff members at bedside to assist pt to stand and void.

## 2021-02-10 NOTE — PROGRESS NOTES
Reji Lucero Author: Dary Rodriguez MD     1933 MRN [id number]   [facility]  Admission 21      Last Hospital Discharge 21 PCP TITA HAYES [facility]  [facility]        CC --admitted to kerri Ray from [facility] Prostate cancer [facility])     radiation   • Sleep apnea    • Spinal stenosis    • Visual impairment     macular degeneration     Past Surgical History:   Procedure Laterality Date   • BACK SURGERY     • CHOLECYSTECTOMY     • CYSTOSCOPY TRANSURETHRAL RESECTION REVIEW OF SYSTEMS:  Review of Systems:   Constitutional: No fevers, chills, fatigue or night sweats. ENT: No mouth pain, neck pain, running nose, headaches or swollen glands.   Skin: No rashes, pruritus or skin changes,  Respiratory: Denies cough, wh cancer    Thrombocytopenia ([facility] [address]      • -Admitted to subacute rehab  • - PT to work on ambulation, gait, balance, strength, endurance, transfers, safety  - OT to work on fine motor skills, ADLs, hygiene, toileting, transfers, safety  - 24h nursing for [facility]

## 2021-02-10 NOTE — PROGRESS NOTES
Reviewed Daniel [address] rights and voluntary with Reji. He stated that he thinks it's a good idea to be evaluated by a psychiatrist so that they can see that he does not want to kill himself like his daughter \"put on me\".

## 2021-02-10 NOTE — PROGRESS NOTES
02/10/21 1706   Sign-In   Paperwork Signed Patient Rights;Voluntary Admission Form   Inpatient Admission Type Adult Voluntary Signed   Patient Provided Rights of Individuals Receiving MH/DD Services   Patient Verbalized Understanding Yes

## 2021-02-11 NOTE — CONSULTS
[facility]  Report of Psychiatric Consultation    Nathanael Gasca Patient Status:  Observation    1933 MRN [id number]   [facility] 3NE-A Attending Halina White, [address] [facility] [address] [facility] [address] Day # 1 PCP Yareli Sinha     Date of Admission:  evaluate the severity of his dementia. 3) DC Ativan IV prn. Continue Haldol 1mg IV every 6 hrs PRN agitation. 4) Will talk to daughter for collateral hx.      Chadd Ruiz  2/11/2021  10:07 AM    History of Present Illness:  79 yo WM with suspected d History:   Diagnosis Date   • Arthritis     left hip   • Bl[facility] ([facility] 03/2020    TURBT, high grade superficial bladder cancer   • Cancer (HCC)     skin & prostate   • Dementia ([facility]    • Diabetes ([facility]    • Essential hypertension     ?  possible ht 24 hr tab 5 mg, 5 mg, Oral, Daily  •  cefTRIAXone Sodium (ROCEPHIN) 2 g in sodium chloride 0.9% 100 mL MBP/ADD-vantage, 2 g, Intravenous, Q24H  •  LORazepam (ATIVAN) injection 1 mg, 1 mg, Intravenous, Q6H PRN  •  haloperidol lactate (HALDOL) 5 MG/ML inject situation  Attention and Concentration: fair, able to perform serial 7's and spell WORLD backwards   Memory:  intact remote and impaired recent  Language: Intact naming and repetition  Fund of Knowledge: Able to recite name of [place] president    Insight: Kirstie Amaral

## 2021-02-11 NOTE — PROGRESS NOTES
[facility] Note: Antimicrobial Weight Based Dose Adjustment for: ceftriaxone (ROCEPHIN)    Reji Bethea is a 80year old patient who has been prescribed ceftriaxone (ROCEPHIN) 1 g every 24 hours.     Estimated Creatinine Clearance: 55.5 mL/min (based on

## 2021-02-11 NOTE — PROGRESS NOTES
NURSING ADMISSION NOTE      Patient admitted via Cart  Oriented to room. Safety precautions initiated. Bed in low position. Call light in reach.     Sitter at bedside for SI  Family updated on POC  Urology and Psych to see in AM  Will continue to mon

## 2021-02-11 NOTE — PLAN OF CARE
Assumed care at 7:30 am.  Sitter at bedside. Cardiac monitoring. SCDs. PIV SL. Echo completed. Electrolyte protocol. Neuropsych consult, call placed. PT eval, see notes. Daughter, Elmer Wills, at bedside and updated regarding plan of care.   SI discontinued, Promote highest level of mobility daily  - Provide frequent reorientation  - Promote wakefulness i.e. lights on, blinds open  - Promote sleep, encourage patient's normal rest cycle i.e. lights off, TV off, minimize noise and interruptions  - Encourage fami

## 2021-02-11 NOTE — PROGRESS NOTES
АЛЕКСАНДР HOSPITALIST  Progress Note     Annette Ayers Patient Status:  Observation    1933 MRN [id number]   [facility] 3NE-A Attending Ct Zuleta, DO   Hosp Day # 0 PCP Regi Pastor MD [address] [place] [address]     Chief Complaint: Suicidal ideation ALKPHO 117  --  147*   AST 26  --  37   ALT 32  --  43   BILT 0.6  --  0.5   TP 7.2  --  6.7     Estimated Creatinine Clearance: 55.5 mL/min (based on SCr of 1.03 mg/dL). No results for input(s): PTP, INR in the last 168 hours.     Recent Labs   Lab 02 family with caregiver and home PT    Quality:  · DVT Prophylaxis: Lovenox  · CODE status: Full  · Oconnor: No  · Central line: No    Will the patient be referred to [facility] on discharge?: No  Estimated date of discharge: TBD  Discharge is dependent on: Clinical

## 2021-02-11 NOTE — PROGRESS NOTES
PSYCH CONSULT    Date of Admission: 2/10/21  Date of Consult: 2/11/21  Reason for Consultation: Suicide precautions, dementia    ADDENDUM: Talked to daughter Miguel A Blackman. He has severe short term memory loss and will forget conversations from 1 hr prior.  He is anx

## 2021-02-11 NOTE — PLAN OF CARE
A/O x 2. At times more confused.  Denies making any statements of suicide  Sitter for safety, SI. Haldol IV Given 1x PRN for agitation  Room air  [place], Impaired vision, no aides  Tele NSR  2D echo needs to be complete  UTI- Rocephin IV  Tylenol for assumed p

## 2021-02-11 NOTE — PHYSICAL THERAPY NOTE
PHYSICAL THERAPY EVALUATION - INPATIENT     Room Number: 2073/2687-J  Evaluation Date: 2/11/2021  Type of Evaluation: Initial  Physician Order: PT Eval and Treat    Presenting Problem: suicidal ideation  Reason for Therapy: Mobility Dysfunction and D FULGURATION BLADDER TUMOR    • CYSTOURETHROSCOPY,FULGUR 2-5CM KENYATTA  03/02/2020    TURBT   • KNEE REPLACEMENT SURGERY     • RADIATION      prostate surgery   • REMOVAL GALLBLADDER     • TOTAL KNEE REPLACEMENT      bilateral       HOME SITUATION  Type of Stone O2 WALK                  AM-PAC '6-Clicks' INPATIENT SHORT FORM - BASIC MOBILITY  How much difficulty does the patient currently have. ..  -   Turning over in bed (including adjusting bedclothes, sheets and blankets)?: A Little   -   Sitting do presents with the following impairments decreased activity tolerance, decreased static and dynamic sitting and standing balance, gait dysfunction. Functional outcome measures completed include AMPAC.   Based on this evaluation, patient's clinical presentat

## 2021-02-11 NOTE — CONSULTS
[facility] Urology   Consultation Note    Jeromy Trotter Patient Status:  Observation    1933 MRN [id number]   [facility] 3NE-A Attending Prem Jacob DO   Hosp Day # 0 PCP Lyndon Craft     Reason for Consultation: SURGERY     • CHOLECYSTECTOMY     • CYSTOSCOPY TRANSURETHRAL RESECTION BLADDER TUMOR N/A 10/30/2020    Performed by Harvey Ruvalcaba MD at [facility]   • CYSTOSCOPY URETEROSCOPY Right 2/7/2021    Performed by Liv Hester MD at [address] [place] [address] tablet, Oral, Q4H PRN  •  ondansetron HCl (ZOFRAN) injection 4 mg, 4 mg, Intravenous, Q6H PRN  •  glucose (DEX4) oral liquid 15 g, 15 g, Oral, Q15 Min PRN **OR** Glucose-Vitamin C (DEX-4) chewable tab 4 tablet, 4 tablet, Oral, Q15 Min PRN **OR** dextrose 5 changes are predominantly along the anterior aspect of the right renal pelvis with right   periureteral stranding. 4 mm left superior renal nonobstructing calcification.   Several punctate left renal cortical low-density foci are too small to accurately ch Renal colic     Kidney stone     Acute cystitis without hematuria     Suicidal ideation     Peripheral edema      Right UVJ calculus status-post cystoscopy, urethral dilatation, right retrograde pyelogram, right ureteroscopy, stone extraction, insertion

## 2021-02-11 NOTE — OCCUPATIONAL THERAPY NOTE
OCCUPATIONAL THERAPY EVALUATION - INPATIENT     Room Number: 1000/1994-V  Evaluation Date: 2/11/2021  Type of Evaluation: Initial  Presenting Problem: weakness    Physician Order: IP Consult to Occupational Therapy  Reason for Therapy: ADL/IADL Dysfunction FULGURATION BLADDER TUMOR    • CYSTOURETHROSCOPY,FULGUR 2-5CM KENYATTA  03/02/2020    TURBT   • KNEE REPLACEMENT SURGERY     • RADIATION      prostate surgery   • REMOVAL GALLBLADDER     • TOTAL KNEE REPLACEMENT      bilateral       OCCUPATIONAL PROFILE    PRISCILLA ACTIVITY TOLERANCE                         O2 SATURATIONS                ACTIVITIES OF DAILY LIVING ASSESSMENT  AM-PAC ‘6-Clicks’ Inpatient Daily Activity Short Form  How much help from another person does the patient currently need…  -   Putting on an functioning below his previous functional level and would benefit from skilled inpatient OT to address the above deficits, maximizing patient’s ability to return safely to his prior level of function.     Patient Complexity  Occupational Profile/Medical His

## 2021-02-12 NOTE — PLAN OF CARE
Assumed pt care at 0730. A&Ox2, forgetful. [place]. VSS. Room air. NSR/SB on tele. 1:1 sitter for safety. Lovenox subcutaneous & SCDs for VTE prevention. Carb controlled diet, QID accuchecks. Denies pain, denies N/V. Voiding, urinal at bedside.  Post void resid Conference as is appropriate  Outcome: Progressing     Problem: CONFUSION  Goal: Confusion, delirium, dementia or psychosis is improved or at baseline  Description: INTERVENTIONS:  - Assess for possible contributors to thought disturbance, including medica SELF HARM  Goal: Patient will be protected from self-harm  Description: INTERVENTIONS:  - Initiate Suicide Precautions, including constant direct observation by a care companion, sitter or RN  - Initiate consults as appropriate with Zackary Kuhn, Spir

## 2021-02-12 NOTE — PROGRESS NOTES
[facility]  Report of Psychiatric Progress Note    Melany Check Patient Status:  Observation    1933 MRN [id number]   [facility] 3NE-A Attending Jacy Calabrese, [address] [facility] Day # 2 PCP Braxton Cooper     Date of Admission: 2 ureteral stent placement. He went to [facility] physical rehab on 2/8/21. He did not want to be there and made a suicidal comment about wanting to die. So he was sent to the ED. He was agitated in the ED and given Ativan.      He does NOT even remember omega Hx:  2/12/21- Neuro-psych testing today showed moderate dementia. He admits to having mild anxiety. He denies depressed mood, but admits to having low energy and motivation. Lexapro started this AM. He wants to go home with home PT rather than back to [facility]. • KNEE REPLACEMENT SURGERY     • RADIATION      prostate surgery   • REMOVAL GALLBLADDER     • TOTAL KNEE REPLACEMENT      bilateral     No family history on file. reports that he quit smoking about 35 years ago. He quit after 30.00 years of use.  He ha Negative for hallucinations, substance abuse and suicidal ideas. The patient is nervous/anxious.       Mental Status Exam:     Objective       02/12/21  1100   BP:    Pulse: 73   Resp:    Temp:      Appearance: fair grooming  Behavior: normal psychomotor  A

## 2021-02-12 NOTE — HOME CARE LIAISON
Due to ptnt admitting diagnosis of SI [facility] is not able to accept this referral. [facility] does not have any psych RN's on staff. TNL re referred ptnt and waiting for response.     ESSENTIAL  [facility]

## 2021-02-12 NOTE — CM/SW NOTE
Spoke with patients daughter Tere Palacios at length regarding the discharge plan. Pending neuro psych. Tere Palacios agrees that her father should not return to [facility].   Tere Palacios states she is looking into hiring caregivers to come and stay with her father at [facility]

## 2021-02-12 NOTE — HOME CARE LIAISON
MET WITH PTNT AND OFFERED CHOICE  OF AGENCIES. PTNT AGREEABLE TO [facility]. MET WITH PTNT TO DISCUSS HOME HEALTH SERVICES AND COVERAGE CRITERIA. PTNT AGREEABLE TO [facility]. PTNT GIVEN RESIDENTIAL BROCHURE.  RESIDENTIAL WITH PROVIDE SN/PT/OT ON D

## 2021-02-12 NOTE — CONSULTS
[facility]  Report of NeuropsycholgyConsultation    Sandra Iniguez Patient Status:  Observation    1933 MRN [id number]   [facility] 3NE-A Attending Debo Starks, DO   Hosp Day # 0 PCP Shreya IBARRA         History:  Histo those remarks because he did not want to be at rehab.     Past Medical History:   Diagnosis Date   • Arthritis     left hip   • Bladder cancer ([facility] [address] 03/2020    TURBT, high grade superficial bladder cancer   • Cancer (HCC)     skin & prostate   • Dementia (H Daily  •  Loperamide HCl (IMODIUM) cap 2 mg, 2 mg, Oral, QID PRN  •  gabapentin (NEURONTIN) cap 100 mg, 100 mg, Oral, Nightly  •  Ocuvite-Lutein (OCUVITE - EYE VITAMIN) tab 1 tablet, 1 tablet, Oral, Daily  •  Oxybutynin Chloride ER (DITROPAN-XL) 24 hr tab chronic small vessel ischemic disease is noted.  No evidence of intracranial hemorrhage or extra-axial fluid collection.       Reason for Referral     The patient was referred for a neuropsychological evaluation to help establish a baseline level of cogniti in line with an Alzheimer's disease process. Certainly there are some acute factors at play but overall this profile is consistent with which could be an Alzheimer's disease process.     On the neurobehavioral cognitive status examination he achieved a del worthlessness, and suicidal ideation. He describes that he is happy to be alive. When trying to process his tendency to give up he stated \"I tend to give up more on myself than on you\".   Certainly this is a depressive process but when trying to connect into his emotional state. At this time there is no evidence of intrusive suicidal ideation or deep depressive process that will result in a likely impulsive reaction. Overall this seems to be more of a passive avoidance process.     I did have an opportun

## 2021-02-12 NOTE — PROGRESS NOTES
[facility]  Urology Progress Note    Olivia Freeman Patient Status:  Observation    1933 MRN [id number]   [facility] 3NE-A Attending Terry Mayo DO   Hosp Day # 0 PCP TITA HAYES [facility]     Subjective:  Kaylen Houser is a(n)

## 2021-02-12 NOTE — PLAN OF CARE
A/O x 2. Sitter for safety.  No SI, suicide precautions DCd  Not agitated overnight- cooperative and pleasant  Room air  Tele NSR, SB  Dangler in place- plan to remove in a few days after PO ABX  CDIFF (-) immodium given PO PRN for diarrhea  Brief for incon appropriate  Outcome: Progressing     Problem: CONFUSION  Goal: Confusion, delirium, dementia or psychosis is improved or at baseline  Description: INTERVENTIONS:  - Assess for possible contributors to thought disturbance, including medications, impaired v Patient will be protected from self-harm  Description: INTERVENTIONS:  - Initiate Suicide Precautions, including constant direct observation by a care companion, sitter or RN  - Initiate consults as appropriate with [address]

## 2021-02-12 NOTE — PROGRESS NOTES
АЛЕКСАНДР HOSPITALIST  Progress Note     Chrisn Every Patient Status:  Observation    1933 MRN [id number]   [facility] 3NE-A Attending Rocky Gerber, DO   Hosp Day # 0 PCP Myrtle Bragg  [facility]     Chief Complaint: Suicidal ideation 139 139   K 4.1 4.2 4.5    105 111   CO2 24.0 26.0 25.0   ALKPHO 117  --  147*   AST 26  --  37   ALT 32  --  43   BILT 0.6  --  0.5   TP 7.2  --  6.7     Estimated Creatinine Clearance: 55.5 mL/min (based on SCr of 1.03 mg/dL).     No results for inp monitoring  8. Neuropathy on gabapentin  9. History of prostate cancer s/p radiation  10. Dementia  1. Start lexapro  2. Dr. Jaylene Rouse saw patient  3. Psych recommending not to discharge back to [facility].  Family looking into caregiver with [facility]    Quality:  ·

## 2021-02-13 NOTE — PROGRESS NOTES
[facility] Patient Status:  Observation    1933 MRN [id number]   [facility] 3NE-A Attending Rabia Ragsdale, DO   Hosp Day # 0 PCP Gavi Barragan MD [facility]     Subjective:   Interval History: s/p URS stent placemen 02/10/2021    CREATSERUM 1.41 (H) 02/07/2021    CREATSERUM 1.59 (H) 02/06/2021     CBC:    Lab Results   Component Value Date    WBC 5.9 02/10/2021    WBC 7.7 02/07/2021    WBC 8.1 02/06/2021     Lab Results   Component Value Date    HGB 9.9 (L) 02/10/2021

## 2021-02-13 NOTE — PLAN OF CARE
Assumed care of pt around 1915  A&O x4, forgetful and impulsive at times  Pt became confused and agitated, wanted to leave, pt was reoriented and cooperative with staff  RA  NSR on tele   SCD's in place  Frequent urination, no BM this shift   Denies any pa appropriate  Outcome: Progressing     Problem: CONFUSION  Goal: Confusion, delirium, dementia or psychosis is improved or at baseline  Description: INTERVENTIONS:  - Assess for possible contributors to thought disturbance, including medications, impaired v Patient will be protected from self-harm  Description: INTERVENTIONS:  - Initiate Suicide Precautions, including constant direct observation by a care companion, sitter or RN  - Initiate consults as appropriate with [address]

## 2021-02-13 NOTE — CM/SW NOTE
RN alerted SW of expected discharge today. Chart reviewed and [facility] has accepted case for care post hospitalization. SW alerted [facility] via Aidin of expected discharge later today.   / to remain available for support

## 2021-02-13 NOTE — DISCHARGE SUMMARY
АЛЕКСАНДР HOSPITALIST  DISCHARGE SUMMARY     Garlin Marker Patient Status:  Observation    1933 MRN [id number]   [facility] 3NE-A Attending Mortimer Bullocks, DO   Hosp Day # 0 PCP Nisha IBARRA     Date of Admission: 2/10/2021  Date was negative. Chest x-ray was without acute findings. EKG without acute ischemia. He was given a dose of ceftriaxone as there were some white blood cells in his urine but no bacteria. He was given 20 mg of Lasix for lower extremity edema.   When I saw th antibiotics. He is anemic, hemoglobin was at baseline. He was found to have iron deficiency and was treated with IV iron in the hospital.  Given history of bladder tumor, he will have repeat cystoscopy in 3 months when he follows up with urology.   He was these medications    cephALEXin 500 MG Caps  Commonly known as: Mora Bautista              Where to Get Your Medications      [facility] [phone], [phone] [address]

## 2021-02-13 NOTE — PROGRESS NOTES
Patient seen and examined. Feels well. Dangler stent removed per urology. Will be on abx for 2 more days. [id number] Griselda Carrizales for discharge home with [facility]. Family working on obtaining caregiver. Discharge summary to follow.     Masha Bhat,

## 2021-02-18 NOTE — OPERATIVE REPORT
[facility]    PATIENT'S NAME: Rogelio Gusman NACHO   ATTENDING PHYSICIAN: Tye Varela DO   OPERATING PHYSICIAN: Taras Madden M.D.    PATIENT ACCOUNT#:   [de-identified]    LOCATION:  [address] [place] [address]  MEDICAL RECORD #:   [id number]       YOB: 1933  A without difficulty. His side was appropriately marked. General anesthesia was induced without difficulty. His legs were placed in the dorsal lithotomy position, padded and secured, and his penoscrotal area prepped and draped in usual sterile fashion.   Radha Jimenez Glidewire to help negotiate but just inside the ureterovesical junction there was a stone seen. I was able to grab it with a basket and retrieve it out.   Repeat endoscopy of the distal ureter showed normal caliber ureter tissue above where the stone was i

## 2021-05-14 PROBLEM — R31.9 HEMATURIA: Status: ACTIVE | Noted: 2021-01-01

## 2021-05-15 PROBLEM — R31.9 HEMATURIA, UNSPECIFIED TYPE: Status: ACTIVE | Noted: 2021-01-01

## 2021-05-15 NOTE — H&P
АЛЕКСАНДР HOSPITALIST  History and Physical     Neal Gonzalez Patient Status:  Emergency    1933 MRN [id number]   Location [facility] Attending Darell Iraheta, [facility] Day # 0 PCP Othella Kayser MD [facility]     Chief Complain • CYSTOURETHROSCOPY W/ IRRIGATION & EVACUATION CLOTS  10/30/2020    CYSTOSCOPY, CLOT EVACUATION, FULGURATION BLADDER TUMOR    • CYSTOURETHROSCOPY,FULGUR 2-5CM LESN  03/02/2020    TURBT   • KNEE REPLACEMENT SURGERY     • RADIATION      prostate surgery rhonchi. Cardiovascular: S1, S2. Regular rate and rhythm. No murmurs, rubs or gallops. Equal pulses. Chest and Back: No tenderness or deformity. Abdomen: Soft, nontender, nondistended. Positive bowel sounds. No rebound, guarding or organomegaly.   Khushboo Ruiz SCD's  · CODE status: Full  · Oconnor: N/A  · If COVID testing is negative, may discontinue isolation: yes     Plan of care discussed with pt at bedside.     Rufino Cali DO  5/15/2021

## 2021-05-15 NOTE — PLAN OF CARE
Assumed care at 0700. A/Ox2-3. R/A. . Oconnor in place w/cbi. Clamped at 1400 per Uro. Orders to resume if urine becomes darker pink or if clots become present. Urine currently light yellow w/no clots. Pt remains agaitated.  4mg haldol and 1 mg IV ativan g injury  Description: (Example usage: patient with low platelets)  INTERVENTIONS:  - Avoid intramuscular injections, enemas and rectal medication administration  - Ensure safe mobilization of patient  - Hold pressure on venipuncture sites to achieve adequat

## 2021-05-15 NOTE — ED PROVIDER NOTES
Patient Seen in: [facility] Emergency Department      History   Patient presents with:  Urinary Symptoms    Stated Complaint: blood in urine    HPI/Subjective:   HPI    This is an 80-year-old male past medical history of prostate cancer status post Peyton 9, PSA 5.71 from 7/16/07, s/p IMRT radiation with short course of Lupron (LD June 2008)   • Sleep apnea    • Spinal stenosis    • Visual impairment     macular degeneration              Past Surgical History:   Procedure Laterality Date   • BACK bilaterally with no rales, no retractions, and no wheezing. HEART:  Regular rate and rhythm. S1 and S2. No murmurs, no rubs or gallops. ABDOMEN: Soft, nontender and nondistended. Normoactive bowel sounds. No rebound. No guarding.    : Uncircumcised mal includes the Dose Index Registry. PATIENT STATED HISTORY: (As transcribed by Technologist)  Patient with blood in urine and painful urination. FINDINGS:  KIDNEYS:  5 mm calcification at the right ureterovesicular junction is noted.   6 mm calcification blood cell count 5.6. Hemoglobin 13.1. Platelet 768. CMP: BUN 20. Creatinine 1.0.  Glucose 148. Rapid Covid was negative. Patient is on been unable to provide a urine specimen as of yet.   If he continues to be unable to void we will place a reginald

## 2021-05-15 NOTE — RESPIRATORY THERAPY NOTE
Patient is suppose to wear cpap at night. RN stated to me that he just got him to calm down and will not tolerate cpap at this time. Will assess later this evening. YUMI protocol in place. Will continue to monitor.

## 2021-05-15 NOTE — PLAN OF CARE
Pt. A&O x1, very forgetful and hard of hearing. On RA, no tele ordered, later placed on tele d/t medication given. CBI going at fast rate, urine pink and  clear. Up with x2/max assist. NPO, Q6 accuchecks.  Pt. Became very combative and aggressive, was not f needed  - Follow urinary retention protocol/standard of care  - Consider collaborating with pharmacy to review patient's medication profile  - Implement strategies to promote bladder emptying  Outcome: Progressing     Problem: HEMATOLOGIC - ADULT  Goal: Ma resources  Description: INTERVENTIONS:  - Identify barriers to discharge w/pt and caregiver  - Include patient/family/discharge partner in discharge planning  - Arrange for needed discharge resources and transportation as appropriate  - Identify discharge

## 2021-05-15 NOTE — CONSULTS
[facility]    Report of Consultation    Avelino Norman Patient Status:  Inpatient    1933 MRN [id number]   [facility] 3NE-A Attending Camilo Deleon MD   Hosp Day # 0 PCP Gavi Barragan MD [facility]     Reason for Consultation:  hematu SURGERY     • CHOLECYSTECTOMY     • COLONOSCOPY  10/26/2009    radiation proctitis   • Leva Esthela Right 02/07/2021    cysto right URS, stone extraction, rpg, urethral dilation   • CYSTOURETHROSCOPY  02/05/2021    office BTS rafal hussein (REGLAN) injection 10 mg, 10 mg, Intravenous, Q8H PRN  •  docusate sodium (COLACE) cap 100 mg, 100 mg, Oral, BID  •  PEG 3350 (MIRALAX) powder packet 17 g, 17 g, Oral, Daily PRN  •  magnesium hydroxide (MILK OF MAGNESIA) 400 MG/5ML suspension 30 mL, 30 mL, affect      Laboratory Data:  Lab Results   Component Value Date    WBC 8.0 05/15/2021    HGB 13.6 05/15/2021    HCT 43.2 05/15/2021    .0 05/15/2021    CREATSERUM 0.79 05/15/2021    BUN 18 05/15/2021     05/15/2021    K 3.8 05/15/2021    CL 1 is enlarged measuring 16 cm. AORTA/VASCULAR:  Vascular calcifications are noted. RETROPERITONEUM:  No mass or adenopathy.     BOWEL/MESENTERY:  Diverticulosis of the colon without evidence of acute diverticulitis. ABDOMINAL WALL:  No mass or hernia.

## 2021-05-15 NOTE — ED INITIAL ASSESSMENT (HPI)
Patient here with c/o blood in urine and pain with urination for about week but more blood noted today. Patient states he is able to urinate without trouble.

## 2021-05-16 NOTE — PROGRESS NOTES
Pt had CBI going at slow rate throughout the night and no clots or blood noted  In restraints per order- pt still agitated and not following commands at times, trying to get up  Ativan IV PRN agitation  Morphine given for pain- pt much calmer after morphin

## 2021-05-16 NOTE — CONSULTS
[facility]  Psychiatric Evaluation    Annette Ayers YOB: 1933   Age/Gender 80year old male MRN [id number]   [facility] 3NE-A Attending Milagros Acevedo MD   Hosp Day # 1 PCP TITA Modi     Date of Service:  5/ agitated. Patient was placed on as needed Haldol and received 5 dosages. However this was ineffective. Patient was also given concurrent Ativan and later this was switched to Ativan and morphine sulfate. Staff report that he responded better to this. 1.6 oz)   SpO2 94%   BMI 30.70 kg/m²     MSE:   Patient is lethargic and oriented x1. Possibly 2 with knowing he is in the hospital.  Patient with psychomotor retardation. Patient in a Posey.   Patient concentration and memory were difficult to test aditya Standing Status: Standing          Number of Occurrences: 1          Order Specific Question: Release to patient          Answer: Immediate      Comp Metabolic Panel (14)          Standing Status: Standing          Number of Occurrences: 1          O

## 2021-05-16 NOTE — PROGRESS NOTES
АЛЕКСАНДР HOSPITALIST  Progress Note     Abbe Perez Patient Status:  Inpatient    1933 MRN [id number]   [facility] 3NE-A Attending Tyshawn Ramirez MD   Hosp Day # 1 PCP TITA HAYES [address] [place] [address]     Chief Complaint: hematuria   S:  Rest hours.    Inflammatory Markers  No results for input(s): CRP, JOEY, LDH, DDIMER in the last 168 hours. Imaging: Imaging data reviewed in Epic.   Medications:   • morphINE sulfate  4 mg Intravenous Once   • gabapentin  100 mg Oral Nightly   • docusate sodiu

## 2021-05-16 NOTE — PLAN OF CARE
Assumed care at 0700, Alert to self. Agitated this AM. Ativan given per mar. 2 mg morphine for pain. Pt became somnolent. Needed 2-4LNC at times. Currently 2LNC while sleeping. CXR showed pulmonary congestion. IV lasix ordered and sounding better.   Removes

## 2021-05-16 NOTE — PROGRESS NOTES
[facility] Urology Progress Note    Abbe Perez Patient Status:  Inpatient    1933 MRN [id number]   [facility] 3NE-A Attending Tyshawn Ramirez MD   Hosp Day # 1 PCP TITA IBARRA     Subjective:  Kaden Meadows is a Major neurocognitive disorder (HCC)     Hematuria     Hematuria, unspecified type     Wheezing      IMPRESSION    1.  Gross hematuria  -Likely due to 5 mm right distal ureteral calculus and bladder calculi  -Urine clear today on slow drip CBI  -UA does not

## 2021-05-17 PROBLEM — Z71.89 GOALS OF CARE, COUNSELING/DISCUSSION: Status: ACTIVE | Noted: 2021-01-01

## 2021-05-17 PROBLEM — Z51.5 PALLIATIVE CARE ENCOUNTER: Status: ACTIVE | Noted: 2021-01-01

## 2021-05-17 NOTE — SLP NOTE
ADULT SWALLOWING EVALUATION    ASSESSMENT    ASSESSMENT/OVERALL IMPRESSION:  Justyna Perez is a 80year old male with history of bladder and prostate cancer, hypertension, sleep apnea presented to the emergency room with blood in the urine.      Orders were SLP service warranted  Discharge Recommendations/Plan: Undetermined    HISTORY   MEDICAL HISTORY  Reason for Referral: R/O aspiration    Problem List  Principal Problem:    Hematuria, unspecified type  Active Problems:    Type 2 diabetes mellitus without c mode)    Oral Phase of Swallow: Within Functional Limits     Pharyngeal Phase of Swallow: Within Functional Limits  (Please note: Silent aspiration cannot be evaluated clinically.  Videofluoroscopic Swallow Study is required to rule-out silent aspiration.)

## 2021-05-17 NOTE — PLAN OF CARE
Alert to self, patient confused. Yells out at times. Remains in María vest and bilateral soft wrist restraints- no s/s of injury, frequent monitoring. On 1L of oxygen, lungs diminished with faint crackles. Nebs PRN. No MARY KATE noted.  BLE +3 pitting edema- rece patient's medication profile  - Implement strategies to promote bladder emptying  Outcome: Progressing     Problem: HEMATOLOGIC - ADULT  Goal: Maintains hematologic stability  Description: INTERVENTIONS  - Assess for signs and symptoms of bleeding or hemor patient/family/discharge partner in discharge planning  - Arrange for needed discharge resources and transportation as appropriate  - Identify discharge learning needs (meds, wound care, etc)  - Arrange for interpreters to assist at discharge as needed  -

## 2021-05-17 NOTE — PLAN OF CARE
Patient A&O x 1. Room air, O2 sats of 96%. Edematous 2+ BLE, Scheduled lasix given. Palliative on consult to discuss [address] with family. DNR/Comfort changed to DNR/Selective. Cardiology consulted regarding edema and CXR results.  Spoke to Dr. Fior Zafar when he w

## 2021-05-17 NOTE — PROGRESS NOTES
[facility]  Urology Progress Note    Libertad Mahajan Patient Status:  Inpatient    1933 MRN [id number]   [facility] 3NE-A Attending Norah Menard MD   Hosp Day # 2 PCP TITA IBARRA     Subjective:  Ramy Fitzgerald Fairly is a(n) removed. Nurse was present in the room at that time.     TULIO Melo  [facility]rology

## 2021-05-17 NOTE — CM/SW NOTE
Care Progression Note:  Active Acute Medical Issue:   Hematuria, unspecified type - Likely due to nephrolithiasis    Other Contributing Medical Factors/Dx. : hx of prostate cancer s/p radiation and TURP, type 2 diabetes, peripheral neuropathy, thrombocytope

## 2021-05-17 NOTE — CONSULTS
[facility]  Consultation Note      Arpan Parson Patient Status:  Inpatient    1933 MRN [id number]   [facility] 3NE-A Attending Lesley Amos MD   Hosp Day # 2 PCP TITA HAYES [facility]     Reason for consultati sclerosis.     Medications:  OLANZapine (ZYPREXA) 5 mg in Sterile Water for Injection IM injection, 5 mg, Intramuscular, BID PRN  Donepezil HCl (ARICEPT) tab 5 mg, 5 mg, Oral, Nightly  LORazepam (ATIVAN) injection 1 mg, 1 mg, Intravenous, BID PRN  ipratropi Or  Glucose-Vitamin C (DEX-4) chewable tab 8 tablet, 8 tablet, Oral, Q15 Min PRN  Belladonna Alkaloids-Opium (B&O SUPPRETTES) 16.2-60 MG rectal suppository 1 suppository, 1 suppository, Rectal, Q6H PRN  Phenazopyridine HCl (PYRIDIUM) tab 200 mg, 200 mg, Or years ago. He quit after 30.00 years of use. He has never used smokeless tobacco. He reports that he does not drink alcohol and does not use drugs.      Allergies  No Known Allergies      Review of Systems:  Constitutional: negative for fevers  Eyes: negati 1.07 09/06/2020        Lab Results   Component Value Date    CREATSERUM 0.97 05/17/2021    BUN 16 05/17/2021     05/17/2021    K 3.5 05/17/2021     05/17/2021    CO2 23.0 05/17/2021     05/17/2021    CA 9.4 05/17/2021    PGLU 143 05/17/2

## 2021-05-17 NOTE — PROGRESS NOTES
[facility]  Report of Psychiatric Progress Note    Christofer Orr Patient Status:  Inpatient    1933 MRN [id number]   [facility] 3NE-A Attending Honey Bang MD   Hosp Day # 2 PCP TITA IBARRA     Date of Admission:  smoker     Psych Family History: Grandmother with dementia. Social and Developmental History:  with 6 children. 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    History from my conversation with daughter in 2/2021.    Per alexander IRRIGATION & EVACUATION CLOTS  10/30/2020    CYSTOSCOPY, CLOT EVACUATION, FULGURATION BLADDER TUMOR    • CYSTOURETHROSCOPY,FULGUR 2-5CM LESN  03/02/2020    TURBT   • KNEE REPLACEMENT SURGERY     • RADIATION      prostate surgery   • REMOVAL GALLBLADDER (MIRALAX) powder packet 17 g, 17 g, Oral, Daily PRN  •  magnesium hydroxide (MILK OF MAGNESIA) 400 MG/5ML suspension 30 mL, 30 mL, Oral, Daily PRN  •  bisacodyl (DULCOLAX) rectal suppository 10 mg, 10 mg, Rectal, Daily PRN  •  Fleet Enema (FLEET) 7-19 GM/1 05/17/2021    BUN 16 05/17/2021     05/17/2021    K 3.5 05/17/2021     05/17/2021    CO2 23.0 05/17/2021     05/17/2021    CA 9.4 05/17/2021    PGLU 143 05/17/2021

## 2021-05-17 NOTE — PROGRESS NOTES
АЛЕКСАНДР HOSPITALIST  Progress Note     Olivia Freeman Patient Status:  Inpatient    1933 MRN [id number]   [facility] 3NE-A Attending Omer Steele MD   Hosp Day # 2 PCP TITA HAYES [address] [place] [address]     Chief Complaint: hematuria   S:  Over in the last 168 hours. Cardiac  No results for input(s): TROP, PBNP in the last 168 hours. Creatinine Kinase  No results for input(s): CK in the last 168 hours.     Inflammatory Markers  No results for input(s): CRP, JOEY, LDH, DDIMER in the last 168 h

## 2021-05-17 NOTE — CONSULTS
[facility]  [id number]  Hospital Day #2  Date of Consult: 05/17/21  Patient seen at: [facility]    Reason for Consultation:      Consult requested by Dr. Quinn Mak for evaluation of palliati Antonia (LD June 2008)   • Sleep apnea    • Spinal stenosis    • Visual impairment     macular degeneration     Past Surgical History:   Procedure Laterality Date   • BACK SURGERY     • CHOLECYSTECTOMY     • COLONOSCOPY  10/26/2009    radiation proctitis Or  dextrose, 50 mL, Q15 Min PRN   Or  glucose, 30 g, Q15 Min PRN   Or  Glucose-Vitamin C, 8 tablet, Q15 Min PRN  LORazepam, 1 mg, Q6H PRN  Belladonna Alkaloids-Opium, 1 suppository, Q6H PRN  Phenazopyridine HCl, 200 mg, TID PRN  Saline Nasal Spray, 1 spra PRN  ondansetron HCl (ZOFRAN) injection 4 mg, 4 mg, Intravenous, Q6H PRN  Metoclopramide HCl (REGLAN) injection 10 mg, 10 mg, Intravenous, Q8H PRN  docusate sodium (COLACE) cap 100 mg, 100 mg, Oral, BID  PEG 3350 (MIRALAX) powder packet 17 g, 17 g, Oral, D Imaging:  XR CHEST AP PORTABLE  (CPT=71045)    Result Date: 5/16/2021  CONCLUSION:  Shallow inspiration. There is cardiac enlargement with pulmonary venous congestion.   Increased interstitial markings throughout both lungs suspicious for pulmonary e any work Mainly Assist Normal or Reduced Full or confused   30 Bedbound Extensive Disease  Can't do any work Max Assist  Total Care Reduced Drowsy/confused   20 Bedbound Extensive Disease  Can't do any work Max Assist  Total Care Minimal Drowsy/confused understanding of her dad's cancer hx. She understood his current issue of kidney stones and I reviewed Urology POC to remove puente with OP f/u and monitor for stone passing.  She was concerned about the new pulmonary edema and requested cardiology evaluatio Treatment to  DNAR/Selective Treatment,   POLST:  Deferred as GOC are ongoing. Discussed with Magnus Arango that form would need to be completed prior to discharge once final GOC determined pending clinical course. HCPOA:  Document on file Yes [x] No [].  Reque options. Goal to avoid any surgical/anesthesia procedures that may exacerbate dementia/AMS. Requesting cardiology consult for recs. Appreciate SLP recs to increase diet; await eval of PO intake.     Symptom Management: will defer to Dr. Charity Ribera for ma

## 2021-05-18 NOTE — PROGRESS NOTES
[address] [facility] [address] Follow Up    Arpan Parson  [id number]  Patient seen at: [facility] Day #3    Subjective:      Pain in L foot today (thought to be gout flare).      Patient seen and evaluated, no family at bed Aspart Pen (NOVOLOG) 100 UNIT/ML flexpen 1-10 Units, 1-10 Units, Subcutaneous, TID CC and HS  •  ipratropium-albuterol (DUONEB) nebulizer solution 3 mL, 3 mL, Nebulization, Q4H PRN  •  Oconnor / urology care, , , Until Discontinued **AND** O[place]nor / urology ca suppository, Rectal, Q6H PRN  •  Phenazopyridine HCl (PYRIDIUM) tab 200 mg, 200 mg, Oral, TID PRN  •  haloperidol lactate (HALDOL) 5 MG/ML injection 1 mg, 1 mg, Intravenous, Once  •  Saline Nasal Spray (SALINE MIST) 1 spray, 1 spray, Each Nare, Q3H PRN  No °F (36.3 °C) (Oral)   Resp 17   Ht 6' 2\" (1.88 m)   Wt 239 lb 1.6 oz (108.5 kg)   SpO2 96%   BMI 30.70 kg/m²     General: awake and in no apparent distress. Body habitus: Overweight   HEENT: Normocephalic, sclera anicteric, no injection.  Oral mucous memb pending facility chosen. Advance Care Planning counseling and discussion:    Code Status:  DNAR/Selective Treatment  POLST: Discussed with Carolina Pathak need to complete POLST prior to DC. Reviewed/confirmed new/current code status.  Reviewed Section C regardi Treatment  3. POLST: need to complete POLST prior to DC. Reviewed/confirmed new/current code status with Bonifacio Camejo by phone. Reviewed Section C regarding wishes for medical nutrition.  She will d/w her family and will be in tomorrow morning to complete and sign

## 2021-05-18 NOTE — PROGRESS NOTES
[facility]  Urology Progress Note    José Miguel Baez Patient Status:  Inpatient    1933 MRN [id number]   [facility] 3NE-A Attending Sheldon Peters MD   Hosp Day # 3 PCP TITA IBARRA     Subjective:  Harvey Barboza is a(n)

## 2021-05-18 NOTE — PROGRESS NOTES
АЛЕКСАНДР HOSPITALIST  Progress Note     Radhika Main Patient Status:  Inpatient    1933 MRN [id number]   [facility] 3NE-A Attending Nolan Bess MD   Hosp Day # 3 PCP TITA HAYES [address] [place] [address]     Chief Complaint: hematuria   S:  Over hours. Cardiac  No results for input(s): TROP, PBNP in the last 168 hours. Creatinine Kinase  No results for input(s): CK in the last 168 hours. Inflammatory Markers  No results for input(s): CRP, JOEY, LDH, DDIMER in the last 168 hours.    Imaging:

## 2021-05-18 NOTE — PHYSICAL THERAPY NOTE
PHYSICAL THERAPY EVALUATION - INPATIENT     Room Number: 8487/3117-X  Evaluation Date: 5/18/2021  Type of Evaluation: Initial  Physician Order: PT Eval and Treat    Presenting Problem: Hematuria  Reason for Therapy: Mobility Dysfunction and Discharge bladder cancer   • Cancer ([facility]     skin & prostate   • Dementia ([facility]    • Diabetes ([facility]    • Essential hypertension     ?  possible htn   • Exposure to medical diagnostic radiation     12 years ago   • Full dentures    • Gout    • Hearing impairment able to assist c needs PRN    SUBJECTIVE  \"I feel just fine\"    Patient self-stated goal is go home    OBJECTIVE  Precautions: Bed/chair alarm;Hard of hearing; Low vision  Fall Risk: High fall risk    WEIGHT BEARING RESTRICTION  Weight Bearing Restriction (including a wheelchair)?: A Little   -   Need to walk in hospital room?: A Little   -   Climbing 3-5 steps with a railing?: A Lot       AM-PAC Score:  Raw Score: 17   Approx Degree of Impairment: 50.57%   Standardized Score (AM-PAC Scale): 42.13   CMS Mod manager/    ASSESSMENT   Patient is a 80year old male admitted on 5/14/2021 for Hematuria. Pertinent comorbidities and personal factors impacting therapy include dementia, HTN, gout, bladder CA, PN, PNA and YUMI.   In this PT evaluation, the p

## 2021-05-18 NOTE — CM/SW NOTE
CM informed by Ashley Marquez, physical therapy has recommended [facility] upon discharge. [facility] referrals initiated. Awaiting physical therapy note stating [facility] is recommended. CM met with Patrick Canela (daughter) to discuss discharge plan.  Daughter received call f

## 2021-05-18 NOTE — PLAN OF CARE
Assumed care at 0730. Pt a/ox2, VSS, on RA. Pt with pain to left shoulder, PRN medications per MAR. No c/o n/v/d, SOB, dizziness/lightheadedness. LE edema noted. PO vanco decreased by Dr. Kath Madison. IV SL. Loose stools, colace held. Accuchecks QID. No hematuria. Consider collaborating with pharmacy to review patient's medication profile  - Implement strategies to promote bladder emptying  Outcome: Progressing     Problem: HEMATOLOGIC - ADULT  Goal: Maintains hematologic stability  Description: INTERVENTIONS  - Ass w/pt and caregiver  - Include patient/family/discharge partner in discharge planning  - Arrange for needed discharge resources and transportation as appropriate  - Identify discharge learning needs (meds, wound care, etc)  - Arrange for interpreters to ass

## 2021-05-18 NOTE — PLAN OF CARE
Alert to person and place. Behavior calm and cooperative- not agitated this shift. On room air, lungs clear and diminished with faint crackles. Nebs PRN. No MARY KATE noted. BLE pitting edema, improved from previous night. Receiving PO Lasix BID.  Abdomen soft an Maintains hematologic stability  Description: INTERVENTIONS  - Assess for signs and symptoms of bleeding or hemorrhage  - Monitor labs and vital signs for trends  - Administer supportive blood products/factors, fluids and medications as ordered and appropr learning needs (meds, wound care, etc)  - Arrange for interpreters to assist at discharge as needed  - Consider post-discharge preferences of patient/family/discharge partner  - Complete POLST form as appropriate  - Assess patient's ability to be responsib

## 2021-05-18 NOTE — CM/SW NOTE
[facility] choice and quality data given to Severa Sakai (daughter). Lei Maurice [facility] reserved as requested by family. Community palliative care referral placed via [facility]- choice pending.     Cyn Steven, RN, BSN   [phone]

## 2021-05-18 NOTE — SLP NOTE
SPEECH DAILY NOTE - INPATIENT    ASSESSMENT & PLAN   ASSESSMENT  Pt seen for dysphagia tx to assess tolerance with recommended diet, ensure proper utilization of aspiration precautions and provide pt/family education.   Patient seated upright and midline in

## 2021-05-18 NOTE — PROGRESS NOTES
[facility]  Progress Note    Garlin Marker Patient Status:  Inpatient    1933 MRN [id number]   [facility] 3NE-A Attending Stefani Tapia MD   Hosp Day # 3 PCP TITA HAYES [facility]     Reason for consultation: fl accessory muscle use  Abdomen: Soft, non-tender; bowel sounds are normoactive  Extremities: No clubbing/cyanosis; moves all 4 extremities normally    OLANZapine (ZYPREXA) 5 mg in Sterile Water for Injection IM injection, 5 mg, Intramuscular, BID PRN  Donep 50 mL, 50 mL, Intravenous, Q15 Min PRN   Or  glucose (DEX4) oral liquid 30 g, 30 g, Oral, Q15 Min PRN   Or  Glucose-Vitamin C (DEX-4) chewable tab 8 tablet, 8 tablet, Oral, Q15 Min PRN  Belladonna Alkaloids-Opium (B&O SUPPRETTES) 16.2-60 MG rectal supposit

## 2021-05-18 NOTE — OCCUPATIONAL THERAPY NOTE
OCCUPATIONAL THERAPY EVALUATION - INPATIENT     Room Number: 3906/2829-Z  Evaluation Date: 5/18/2021  Type of Evaluation: Initial  Presenting Problem: Hematuria    Physician Order: IP Consult to Occupational Therapy  Reason for Therapy: ADL/IADL Dysfunctio [facility])    Palliative care encounter    Goals of care, counseling/discussion    Acute diastolic CHF (congestive heart failure) [facility])      Past Medical History  Past Medical History:   Diagnosis Date   • Arthritis     left hip   • Bladder cancer ([facility] 03/2020 dtr)    Occupation/Status: Retired  ([employer], worked in steel industry)  Hand Dominance: Right  Drives: No  Patient Regularly Uses: None    Prior Level of Function:   Pt reports being independent w/ ADL and functional mobility via RW.  Pt's dtr reports holly decreased speed;Right decreased speed       ACTIVITY TOLERANCE           BP: 123/61  BP Location: Left arm  BP Method: Automatic  Patient Position: Sitting    O2 SATURATIONS       ACTIVITIES OF DAILY LIVING ASSESSMENT  AM-PAC ‘6-Clicks’ Inpatient Daily Act addressed; Alarm set; Family present    ASSESSMENT     Patient is a 80year old male admitted on 5/14/2021 for hematuria. Complete medical history and occupational profile noted above. Functional outcome measures completed include AM-PAC.  In this OT evaluati education;Equipment eval/education  Rehab Potential : Good  Frequency (Obs): 3-5x/week  Number of Visits to Meet Established Goals: 5    ADL Goals   Patient will perform upper body dressing:  with min assist  Patient will perform lower body dressing:  with

## 2021-05-18 NOTE — PROGRESS NOTES
[facility]  Report of Psychiatric Progress Note    Antonietacheikh Solid Patient Status:  Inpatient    1933 MRN [id number]   [facility] 3NE-A Attending Lilibeth Paulino MD   Hosp Day # 4 PCP TITA IBARRA     Date of Admission:  History: Dementia. Depressive disorder.      Substance Use History: Ex-cigarette smoker     Psych Family History: Grandmother with dementia. Social and Developmental History:  with 6 children.  He worked in a steel mill for many years and later f cysto, radiation scarring, small stones in the bladder   • CYSTOURETHROSCOPY W/ IRRIGATION & EVACUATION CLOTS  10/30/2020    CYSTOSCOPY, CLOT EVACUATION, FULGURATION BLADDER TUMOR    • CYSTOURETHROSCOPY,FULGUR 2-5CM JOHNN  03/02/2020    TURBT   • KNEE REPLA Intravenous, Q2H PRN **OR** morphINE sulfate (PF) 4 MG/ML injection 4 mg, 4 mg, Intravenous, Q2H PRN  •  ondansetron HCl (ZOFRAN) injection 4 mg, 4 mg, Intravenous, Q6H PRN  •  docusate sodium (COLACE) cap 100 mg, 100 mg, Oral, BID  •  PEG 3350 (MIRALAX) p perform naming    Insight: limited  Judgment: limited    Laboratory Data:  Lab Results   Component Value Date    PGLU 124 05/18/2021

## 2021-05-18 NOTE — CM/SW NOTE
CM received call from [facility] #[phone], fax # [phone]- patient current with company with RN/PT service.      Ashley Dyer RN, BSN   [phone]

## 2021-05-18 NOTE — CM/SW NOTE
CM left message with Sandra Barnes (daughter) to discuss discharge plan to [facility]- awaiting facility choice. Tentative dc to occur on 5/19 (d/t acute gout flare-up) per Dr Debbi Noyola.     Ruthy Cosby, RN, BSN   [phone]

## 2021-05-19 NOTE — PROGRESS NOTES
[address] [facility] 149 Follow Up    Alexandria Bryson Patient Status:  Inpatient    1933 MRN [id number]   [facility] 3NE-A Attending Arpan [facility] Day # 4 PCP Derek IBARRA     Date of visit:   Reduced Full   60 Reduced Significant disease  Can't perform hobby Occasional  Assist Normal or   Reduced Full or confused   50 Mainly sit/lie Extensive Disease  Can't do any work Partial Assist Normal or Reduced Full or confused   40 Mainly in bed Exten insulin (HCC)     Confusion     Diarrhea, unspecified type     Left hip pain     Thrombocytopenia (HCC)     Morbid obesity with BMI of 40.0-44.9, adult ([facility]     Uncontrolled type 2 diabetes mellitus with hyperglycemia ([facility]     Spinal stenosis     Cancer

## 2021-05-19 NOTE — PROGRESS NOTES
[facility]  Report of Psychiatric Progress Note    Sandra Iniguez Patient Status:  Inpatient    1933 MRN [id number]   [facility] 3NE-A Attending Fabian Mcdowell MD   Hosp Day # 5 PCP TITA IBARRA     Date of Admission:  depressed mood. He isn't sure where he will be going, but with reassurance, says, \"that's good\". No agitation or aggression. No prn meds needed the past 48 hrs. Past Psychiatric History: Dementia.  Depressive disorder.      Substance Use History: Ex-c radiation proctitis   • CYSTOSCOPY,URETEROSCOPY,STONE REMV Right 02/07/2021    cysto right URS, stone extraction, rpg, urethral dilation   • CYSTOURETHROSCOPY  02/05/2021    office BTS cysto, radiation scarring, small stones in the bladder   • CYSTOURETHRO 5-325 MG per tab 1 tablet, 1 tablet, Oral, Q4H PRN **OR** HYDROcodone-acetaminophen (NORCO) 5-325 MG per tab 2 tablet, 2 tablet, Oral, Q4H PRN  •  morphINE sulfate (PF) 2 MG/ML injection 1 mg, 1 mg, Intravenous, Q2H PRN **OR** morphINE sulfate (PF) 2 MG/ML volume    Mood: \"all right\"  Affect: calm    Thought process: appears logical when answering my questions  Thought content: no suicidal ideation, no paranoid ideation    Orientation: oriented person and hospital  Attention and Concentration: fair today

## 2021-05-19 NOTE — CM/SW NOTE
2-pm DC  BLS, PCS completed    [facility]  [address] [place] [address] Luis Wen  Phone: [phone]  Fax: [phone]

## 2021-05-19 NOTE — PROGRESS NOTES
[facility]  Progress Note    Radhika Main Patient Status:  Inpatient    1933 MRN [id number]   [facility] 3NE-A Attending Nolan Bess MD   Hosp Day # 4 PCP TITA HAYES [facility]     Reason for consultation: fl 20 mg, Oral, Daily  colchicine tab 0.6 mg, 0.6 mg, Oral, Daily  predniSONE (DELTASONE) tab 20 mg, 20 mg, Oral, Daily with breakfast  OLANZapine (ZYPREXA) 5 mg in Sterile Water for Injection IM injection, 5 mg, Intramuscular, BID PRN  Donepezil HCl (ARICEPT g, 30 g, Oral, Q15 Min PRN   Or  Glucose-Vitamin C (DEX-4) chewable tab 8 tablet, 8 tablet, Oral, Q15 Min PRN  Belladonna Alkaloids-Opium (B&O SUPPRETTES) 16.2-60 MG rectal suppository 1 suppository, 1 suppository, Rectal, Q6H PRN  Phenazopyridine HCl (PYR

## 2021-05-19 NOTE — PROGRESS NOTES
I called and gave report to nurse Tammy Gonzalez at [facility] rehab facility. Patient's physical and history were relayed to nursing staff and included past medical history, admitting diagnosis of hematuria.  Patient will be picked up via Ambulance at 2 pm fro

## 2021-05-19 NOTE — PLAN OF CARE
Patient is A&O1-2; calm, cooperative and pleasant  Hard of hearing  Denies any pain or discomfort  No n/v/d  Afebrile, VSS  Incontinent at times  Fall precaution in place  Accucheck QID  PT rec [facility]  No issues or concerns at this time vital signs for trends  - Administer supportive blood products/factors, fluids and medications as ordered and appropriate  - Administer supportive blood products/factors as ordered and appropriate  Outcome: Progressing  Goal: Free from bleeding injury  Dariel patient/family/discharge partner  - Complete POLST form as appropriate  - Assess patient's ability to be responsible for managing their own health  - Refer to Case Management Department for coordinating discharge planning if the patient needs post-hospital

## 2021-05-19 NOTE — CM/SW NOTE
CM met with patient and daughter and gave her a copy of [facility] quality sheet with facility phone number. She will call them regarding her questions about visiting hours. They are aware that  is setting up transport for between 1-2pm today.      Remy Godoy

## 2021-05-19 NOTE — PLAN OF CARE
Assumed care at 0700. Pt A/Ox1-2. R/A, NSR:SB on tele. Carb 1800 diet. 2 assist w/walker. Denies pain. Uro s/o. Likely discharge to  [place] [address] today. Will continue to monitor.    Problem: GENITOURINARY - ADULT  Goal: Absence of urinary retention  D limitations  - Instruct pt to call for assistance with activity based on assessment  - Modify environment to reduce risk of injury  - Provide assistive devices as appropriate  - Consider OT/PT consult to assist with strengthening/mobility  - Encourage toil

## 2021-05-19 NOTE — PROGRESS NOTES
NURSING DISCHARGE NOTE    Discharged Rehab facility via Ambulance. Accompanied by Support staff  Belongings Taken by patient/family. Discharge and transfer paperwork sent with EMT's to receiving facility. Daughter notified of transfer.  All questi

## 2021-05-19 NOTE — PHYSICAL THERAPY NOTE
Attempted to see Pt this AM - RN aware of attempt. Pt requested to rest.  Will f/u later today if time permits, after all other patients are attempted per tentative schedule.

## 2021-05-23 NOTE — DISCHARGE SUMMARY
АЛЕКСАНДР HOSPITALIST  DISCHARGE SUMMARY     Neal Gonzalez Patient Status:  Inpatient    1933 MRN [id number]   [facility] 3NE-A Attending No att. providers found   Hosp Day # 4 PCP TITA Hope     Date of Admission: 2021 hematuria and patient's mental status back to baseline patient was cleared for discharge to subacute rehab when bed was available and insurance approved.         Lace+ Score: 86  59-90 High Risk  29-58 Medium Risk  0-28   Low Risk         TCM Follow-Up Franck for 1 day.    Stop taking on: May 21, 2021  Quantity: 1 tablet  Refills: 0           Where to Get Your Medications      Please  your prescriptions at the location directed by your doctor or nurse    Bring a paper prescription for each of these medica minutes

## 2021-10-03 PROBLEM — N17.9 ACUTE KIDNEY INJURY (HCC): Status: RESOLVED | Noted: 2021-01-01 | Resolved: 2021-01-01

## 2021-10-03 PROBLEM — R31.9 HEMATURIA, UNSPECIFIED TYPE: Status: RESOLVED | Noted: 2021-01-01 | Resolved: 2021-01-01

## 2021-10-03 PROBLEM — N30.00 ACUTE CYSTITIS WITHOUT HEMATURIA: Status: RESOLVED | Noted: 2021-01-01 | Resolved: 2021-01-01

## 2021-10-03 PROBLEM — D62 ACUTE BLOOD LOSS ANEMIA: Status: ACTIVE | Noted: 2021-01-01

## 2021-10-03 PROBLEM — N23 RENAL COLIC: Status: RESOLVED | Noted: 2021-01-01 | Resolved: 2021-01-01

## 2022-05-10 NOTE — PLAN OF CARE
How Severe Is It?: moderate PROBLEM: Diarrhea/UTI    ASSESSMENT: Pt is A&OX3, forgetful and anxious, prn valium given per MAR. Pt very tearful this shift stating he \"needs to urinate and he is going to explode\", despite reminding him he has a puente catheter in place. VSS, afebrile. Is This A New Presentation, Or A Follow-Up?: Follow Up Acne assistive devices as appropriate  - Consider OT/PT consult to assist with strengthening/mobility  - Encourage toileting schedule  Outcome: Progressing     Problem: DISCHARGE PLANNING  Goal: Discharge to home or other facility with appropriate resources  Michael Additional History: Pt presents for acne follow up and isotret initiation.

## 2022-05-28 NOTE — DISCHARGE SUMMARY
Admitted and discharged within 24 hours. Briefly the patient was admitted for a URI. Viral panel was positive for influenza. His symptom onset was approximately 1 week ago and he currently had minimal symptoms.   I did discuss Tamiflu with him but it is No

## 2024-09-17 NOTE — ANESTHESIA PROCEDURE NOTES
Airway  Urgency: elective      General Information and Staff    Patient location during procedure: OR  Anesthesiologist: Nichole Briones MD  Performed: anesthesiologist     Indications and Patient Condition  Indications for airway management: anesthesia  S
none

## (undated) DEVICE — ZIPWIRE GUIDEWIRE .038X150 STR

## (undated) DEVICE — SOL  .9 3000ML

## (undated) DEVICE — HF-RESECTION ELECTRODE PLASMA-OVALBUTTON BUTTON, OVAL, 24 FR., 12°-30°, ESG TURIS: Brand: OLYMPUS

## (undated) DEVICE — SOL H2O 3000ML IRRIG

## (undated) DEVICE — TIGERTAIL 5F FLXTIP 70CM

## (undated) DEVICE — Device

## (undated) DEVICE — STERILE POLYISOPRENE POWDER-FREE SURGICAL GLOVES: Brand: PROTEXIS

## (undated) DEVICE — KENDALL SCD EXPRESS SLEEVES, KNEE LENGTH, MEDIUM: Brand: KENDALL SCD

## (undated) DEVICE — CAUTERY CORD DISP E0503

## (undated) DEVICE — SYRINGE 30ML LL TIP

## (undated) DEVICE — STERILE LATEX POWDER-FREE SURGICAL GLOVES WITH HYDROGEL COATING, SMOOTH FINISH, STRAIGHT FINGER: Brand: PROTEXIS

## (undated) DEVICE — SOL H2O 1000ML BTL

## (undated) DEVICE — REM POLYHESIVE ADULT PATIENT RETURN ELECTRODE: Brand: VALLEYLAB

## (undated) DEVICE — HYDROGEL COATED URETERAL DILATOR: Brand: NOTTINGHAM ONE-STEP

## (undated) DEVICE — PLASTC TOOMEY SYRNG DISP

## (undated) DEVICE — SYSTEM PUMPING SINGLE ACTION

## (undated) DEVICE — CYSTO CDS-LF: Brand: MEDLINE INDUSTRIES, INC.

## (undated) DEVICE — 1.9FR NITINOL TIPLESS BSKT

## (undated) DEVICE — CATH SECURING DEVICE STATLOCK

## (undated) DEVICE — URINE DRAINAGE BAG,BAG, NEEDLE SAMPLING, DRAIN TUBE: Brand: DOVER

## (undated) DEVICE — ELLIK BLADDER EVACUTR DISP

## (undated) NOTE — LETTER
[address]  [address] [facility]  Authorization for Surgical Operation and Procedure     Date:__February 7, 2021____                                                                                                         Time:_ 4.   Should the need arise during my operation or immediate post-operative period, I also consent to the administration of blood and/or blood products.   Further, I understand that despite careful testing and screening of blood or blood products by kareem 8.   I recognize that in the event my procedure results in extended X-Ray/fluoroscopy time, I may develop a skin reaction. 9.  If I have a Do Not Attempt Resuscitation (DNAR) order in place, that status will be suspended while in the operating room, proc 1. I, Reji Lucero agree to be cared for by an anesthesiologist, who is specially trained to monitor me and give me medicine to put me to sleep or keep me comfortable during my procedure    I understand that my anesthesiologist is not an employee or agent 5. My doctor has explained to me other choices available to me for my care (alternatives).   6. Pregnant Patients (“epidural”):  I understand that the risks of having an epidural (medicine given into my back to help control pain during labor), include itchi

## (undated) NOTE — LETTER
Ruma Alvarado [address] [facility] [address] [facility]  Authorization for Surgical Operation and Procedure   Date:___________                                                                                            Time:__________  1.  I hereby aut 4.   Should the need arise during my operation or immediate post-operative period, I also consent to the administration of blood and/or blood products.   Further, I understand that despite careful testing and screening of blood or blood products by kareem 8.   I recognize that in the event my procedure results in extended X-Ray/fluoroscopy time, I may develop a skin reaction. 9.  If I have a Do Not Attempt Resuscitation (DNAR) order in place, that status will be suspended while in the operating room, proc

## (undated) NOTE — IP AVS SNAPSHOT
[address]            (For Outpatient Use Only) Initial Admit Date: 2/6/2021   Inpt/Obs Admit Date: Inpt: 2/7/21 / Obs: 02/07/21   Discharge Date:    Gilles Du:  [de-identified]   MRN: [de-identified]   CSN: [id number]   CEID: [id number] TERTIARY INSURANCE   Payor:  Plan:    Group Number:  Insurance Type:    Subscriber Name:  Subscriber :    Subscriber ID:  Pt Rel to Subscriber:    Hospital Account Financial Class: Medicare    2021

## (undated) NOTE — IP AVS SNAPSHOT
[address]            (For Outpatient Use Only) Initial Admit Date: 5/14/2021   Inpt/Obs Admit Date: Inpt: 5/15/21 / Obs: N/A   Discharge Date:    Cookie Mccallum:  [de-identified]   MRN: [de-identified]   CSN: [id number]   CEID: [id number] Subscriber: SELF   TERTIARY INSURANCE   Payor:  Plan:    Group Number:  Insurance Type:    Subscriber Name:  Subscriber :    Subscriber ID:  Pt Rel to Subscriber:    Hospital Account Financial Class: Medicare    May 19, 2021

## (undated) NOTE — LETTER
[facility] [place], [address] [facility]    Consent for Anesthesia   1.    I, Donna Lucero agree to be cared for by a physician anesthesiologist alone and/or with a nurse anesthetist, who is specially trained to monitor me and give me me · Rare risks include: remembering what happened during my procedure, allergic reactions to medications, injury to my airway, heart, lungs, vision, nerves, or muscles and in extremely rare instances death.   5. My doctor has explained to me other choices monika Patient Name: Neal Gonzalez     : 1933                 Printed: 2021 at 12:47 PM    Medical Record #: [id number]                                            Page 1 of 1

## (undated) NOTE — IP AVS SNAPSHOT
Patient Demographics     Address  [address] [place] [address] Phone  [phone] (Home) *Preferred*  [phone] [facility]) E-mail Address  [email or web address]      Emergency Contact(s)     Name Relation Home Work Raymond [address] Commonly known as: KEFLEX      Take 1 capsule (500 mg total) by mouth 3 (three) times daily for 3 days.   Stop taking on: February 11, 2021   Angel Lundberg DO   [    ]   [    ]   [    ]   [    ]     gabapentin 100 MG Caps  Commonly known as: Viajy Pastor Temp  97.8 °F (36.6 °C) Filed at 02/08/2021 1136   SpO2  99 % Filed at 02/08/2021 1136      Patient's Most Recent Weight       Most Recent Value   Patient Weight  90.7 kg (199 lb 15.3 oz)      Lab Results Last 24 Hours    No matching results found     Micr History of Present Illness: Reji Thomas is a 80year old male with medical history of essential hypertension and DM type II, bladder tumors and prostate cancer who came to the ER with complaints of abdominal and flank pain .   He was discharged home and r Allergies: No Known Allergies    Medications:  No current facility-administered medications on file prior to encounter. •  Trospium Chloride 20 MG Oral Tab, Take 1 tablet (20 mg total) by mouth 2 (two) times daily. , Disp: 60 tablet, Rfl: 11    •  cleo ALB 3.5      K 4.1      CO2 24.0   ALKPHO 117   AST 26   ALT 32   BILT 0.6   TP 7.2       Estimated Creatinine Clearance: 35.9 mL/min (A) (based on SCr of 1.59 mg/dL (H)). No results for input(s): PTP, INR in the last 168 hours.     Recent La Reji NACHO Faulkneron[JU.2] is a a(n)[JU.3 80year old[JU.2] male with history of prostate cancer s/p radiation, and history of bladder cancer s/p TURBT 3/2020 and cysto fulguration 10/2020 who just had screening cystoscopy on Friday without any gross recurrence Performed by Jessica Alvarez MD at [facility] MAIN OR   • CYSTOURETHROSCOPY  02/05/2021    office BTS cysto, radiation scarring, small stones in the bladder   • CYSTOURETHROSCOPY W/ IRRIGATION & EVACUATION CLOTS  10/30/2020    CYSTOSCOPY, CLOT EVACUATION, FULGU •  magnesium hydroxide (MILK OF MAGNESIA) 400 MG/5ML suspension 30 mL, 30 mL, Oral, Daily PRN  •  bisacodyl (DULCOLAX) rectal suppository 10 mg, 10 mg, Rectal, Daily PRN  •  Fleet Enema (FLEET) 7-19 GM/118ML enema 133 mL, 1 enema, Rectal, Once PRN  •  onda RENUR None Seen 02/06/2021    TRANSUR None Seen 02/06/2021    MUCUR 1+ (A) 02/06/2021    YEASTUR None Seen 02/06/2021    NSEUR None Seen 02/06/2021[JU.4]       urine culture is pending  Started on ceftriaxone       Imaging:  CT Scan[JU.1]  Xr Lumbar Spine Discussed with daughter about surgical options. Prior in the office they were concerned about repeated GA's for their father with his progressing dementia  Reviewed with nursing for pain control options, checking for spont passage.  He does have some incon : Kiera Patterson PT (Physical Therapist)             PHYSICAL THERAPY EVALUATION - INPATIENT     Room Number: 323/323-A  Evaluation Date: 2/8/2021  Type of Evaluation: Initial  Physician Order: PT Eval and Treat    Presenting Problem: s/p cystos cysto right URS, stone extraction, rpg, urethral dilation   • CYSTOURETHROSCOPY  02/05/2021    office BTS cysto, radiation scarring, small stones in the bladder   • CYSTOURETHROSCOPY W/ IRRIGATION & EVACUATION CLOTS  10/30/2020    CYSTOSCOPY, CLOT EVACUAT -   Sitting down on and standing up from a chair with arms (e.g., wheelchair, bedside commode, etc.): A Little   -   Moving from lying on back to sitting on the side of the bed?: A Little   How much help from another person does the patient currently need. Patient is a 80year old male admitted on 2/6/2021 s/p cystoscopy, urethral dilation, right ureteroscopy, irrigation and extraction of bladder debris, RGPG, stone extraction and insertion of right ureteral stent.  Pertinent comorbidities and personal factor EP.1 Coleman Villarreal, PT on 2/8/2021 11:01 AM                        Occupational Therapy Notes (last 72 hours) (Notes from 2/5/2021  3:10 PM through 2/8/2021  3:10 PM)      Occupational Therapy Note signed by Blue Sánchez OT at 2/8/2021 11:05 • CYSTOSCOPY TRANSURETHRAL RESECTION BLADDER TUMOR N/A 10/30/2020    Performed by June Forman MD at [facility] MAIN OR   • Iona Old Right 02/07/2021    cysto right URS, stone extraction, rpg, urethral dilation   • CYSTOURETHROSCOPY Upper extremity strength is within functional limits     COORDINATION  Gross Motor    WFL    Fine Motor    WFL      ADDITIONAL TESTS                                    NEUROLOGICAL FINDINGS                   ACTIVITY TOLERANCE                         O2 SA Patient is a 80year old male admitted on 8/1/6308 for renal colic, s/p cystoscopy. Complete medical history and occupational profile noted above. Functional outcome measures completed include AMPAC, MMT, ROM.  In this OT evaluation patient presents with th Functional Transfer Goals  Patient will transfer from supine to sit:  with supervision  Patient will transfer from sit to stand:  with supervision  Patient will transfer to toilet:  with supervision    UE Exercise Program Goal  Patient will be supervision

## (undated) NOTE — ED AVS SNAPSHOT
April Alba   MRN: [id number]    Department:  [facility] Emergency Department   Date of Visit:  1/22/2018           Disclosure     Insurance plans vary and the physician(s) referred by the ER may not be covered by your plan.  Please contact your tell this physician (or your personal doctor if your instructions are to return to your personal doctor) about any new or lasting problems. The primary care or specialist physician will see patients referred from the [facility] Emergency Department.  Kelvin Fink

## (undated) NOTE — IP AVS SNAPSHOT
Patient Demographics     Address  [address] [place], [place] [address] Phone  [phone] (Home) *Preferred*  [phone] [facility]) E-mail Address  [email or web address]      Emergency Contact(s)     Name Relation Home Work Mobile    Netta Ely on: May 20, 2021  Next dose due: 5/20 at 9AM      Take 1 tablet (20 mg total) by mouth daily. Ramona Do, DO         gabapentin 100 MG Caps  Commonly known as: NEURONTIN  Next dose due: 5/19 at bedtime      Take 100 mg by mouth nightly. 1226   Resp  16 Filed at 05/19/2021 1226   Temp  97.7 °F (36.5 °C) Filed at 05/19/2021 0719   SpO2  99 % Filed at 05/19/2021 1226      Patient's Most Recent Weight       Most Recent Value   Patient Weight  108.5 kg (239 lb 1.6 oz)      Lab Results Last 24 not take any blood thinners. [RZ.2]    Past Medical History:  Past Medical History:   Diagnosis Date   • Arthritis     left hip   • Bladder cancer ([facility] 03/2020    TURBT, high grade superficial bladder cancer   • Cancer ([facility]     skin & prostate   • Mauricio Spatz escitalopram 10 MG Oral Tab, Take 1 tablet (10 mg total) by mouth daily. , Disp: 30 tablet, Rfl: 2  Trospium Chloride 20 MG Oral Tab, Take 1 tablet (20 mg total) by mouth 2 (two) times daily. , Disp: 60 tablet, Rfl: 11  acetaminophen 325 MG Oral Tab, Take 65 AST 23   ALT 31   BILT 0.4   TP 6.9       Estimated Creatinine Clearance: 58.2 mL/min (based on SCr of 1.04 mg/dL). No results for input(s): PTP, INR in the last 168 hours.     COVID-19 Lab Results    COVID-19  Lab Results   Component Value Date    COVID Consult Orders    1.  Consult to Cardiology [[id number]] ordered by Sandie Zarate MD at [address] [place] [address] Cardiology  Consultation Note      Michela Mo Patient Status:  Inpatient    1933 MRN [id number]   Location EDW life\". [DK.2]  Seen by palliative care; while DNR/DNI, aggressive management of medical issues/symptoms is desired. Prior TTE showed LVEF 70-75% with DD1, aortic sclerosis.     Medications:  OLANZapine (ZYPREXA) 5 mg in Sterile Water for Injection IM inject Q15 Min PRN   Or  dextrose 50 % injection 50 mL, 50 mL, Intravenous, Q15 Min PRN   Or  glucose (DEX4) oral liquid 30 g, 30 g, Oral, Q15 Min PRN   Or  Glucose-Vitamin C (DEX-4) chewable tab 8 tablet, 8 tablet, Oral, Q15 Min PRN  Belladonna Alkaloids-Opium ( KNEE REPLACEMENT      bilateral       Family History  family history is not on file. Social History   reports that he quit smoking about 35 years ago. He quit after 30.00 years of use.  He has never used smokeless tobacco. He reports that he does not dri questions appropriately  Dermatologic: No rashes; normal skin turgor    Diagnostic testing:    EKG: Normal sinus rhythm    Labs:   Lab Results   Component Value Date    INR 1.07 09/06/2020        Lab Results   Component Value Date    CREATSERUM 0.97 05/17/ from [facility]) on 5/14/2021 with[EB.2] Hematuria[EB.1]. Pt has a past medical history significant for dementia, HTN, gout, bladder CA, PN, PNA and YUMI. See below for detailed past medical/surgical history.      RAPID-SARS (-) 5/14/21 • Pneumonia due to organism     3 years ago, hospitalized   • Prostate cancer ([facility] 02/2008    Peyton 9, PSA 5.71 from 7/16/07, s/p IMRT radiation with short course of Lupron (LD June 2008)   • Sleep apnea    • Spinal stenosis    • Visual impairment PAIN ASSESSMENT  Ratin  Location: L foot (when in standing)  Management Techniques: Activity promotion; Body mechanics;Repositioning    COGNITION[EB. 1]  · Overall Cognitive Status:  Impaired  · Arousal/Alertness:  appropriate responses to stimuli  · Herman CMS Modifier (G-Code): CK    FUNCTIONAL ABILITY STATUS  Gait Assessment   Gait Assistance: Minimum assistance  Distance (ft): 4 steps; 2 ft  Assistive Device: Rolling walker  Pattern: Shuffle;L Decreased stance time;L Foot flat;Comment (WBOS, mild flexed p and YUMI[EB.2]. In this PT evaluation, the patient presents with the following impairments[EB. 1] posture, endurance, strength, force generating capacity, gait and balance[EB.2]. Functional outcome measures completed include[EB. 1] The AM-PAC '6-Clicks' Inp Roberth Cooley, PT on 5/18/2021  2:40 PM                        Occupational Therapy Notes (last 72 hours) (Notes from 5/16/2021 12:34 PM through 5/19/2021 12:34 PM)      Occupational Therapy Note signed by Pauline Booker at 5/18/2021  4:11 PM  Thad months):   Admitted 2/6/21 - 2/8/21 DC to [facility]  R sided obstructing UVJ stone - s/p cystoscopy/R ureteroscopy, extraction of stone and R ureteral stent placement  Pt admitted to ED for suicidal ideation and c/o not wanting to be at [place] [address] 2/10    Problem List[M TUMOR    • CYSTOURETHROSCOPY,FULGUR 2-5CM LESN  03/02/2020    TURBT   • KNEE REPLACEMENT SURGERY     • RADIATION      prostate surgery   • REMOVAL GALLBLADDER     • TOTAL KNEE REPLACEMENT      bilateral[MM.2]       OCCUPATIONAL PROFILE    HOME SITUATION[MM willing to receive therapy. Per chart review, pt communicated suicidal thoughts with [facility] staff, in which he was admitted to ED.     RANGE OF MOTION AND STRENGTH ASSESSMENT  Upper extremity ROM is within functional limits     Upper extremity strength is with commode brought to bedside, and pt ambulates via RW to commode to complete toilet commode t/f w/ min (A). Pt educated on hand positioning during functional t/fs. Pt received total (A) for brief management.  Pt completed sit > stand toilet t/f via RW and com deficits impacting engagement in ADL/IADL MODERATE  3 - 5 performance deficits   Client Assessment/Performance Deficits MODERATE - Comorbidities and min to mod modifications of tasks    Clinical Decision Making MODERATE - Analysis of occupational profile, (SPEECH-LANGUAGE PATHOLOGIST)       SPEECH DAILY NOTE - INPATIENT    ASSESSMENT & PLAN   ASSESSMENT  Pt seen for dysphagia tx to assess tolerance with recommended diet, ensure proper utilization of aspiration precautions and provide pt/family education.   P please contact Narda DURÁN[CJ.1]    Electronically signed by GREGORIA Ribeiro on 5/18/2021  1:36 PM   Attribution Johnson    CJ.1 - GREGORIA Ribeiro on 5/18/2021  1:27 PM                     Immunizations     Name Date      [address] 03/23/21

## (undated) NOTE — ED AVS SNAPSHOT
[facility] Emergency Department  [place]  [address] [place] [address]  Phone:  [phone]  Fax:  [phone]          Era Steen   MRN: [id number]    Department:  [facility] Emergency Department   Date of Visit:  7/5/2017 CARE PHYSICIAN AT ONCE OR RETURN IMMEDIATELY TO THE EMERGENCY DEPARTMENT.     If you have been prescribed any medication(s), please fill your prescription right away and begin taking the medication(s) as directed    If the emergency physician has read X-ray

## (undated) NOTE — ED AVS SNAPSHOT
Amie Gant   MRN: [id number]    Department:  [facility] Emergency Department   Date of Visit:  10/1/2017           Disclosure     Insurance plans vary and the physician(s) referred by the ER may not be covered by your plan.  Please contact your If you have been prescribed any medication(s), please fill your prescription right away and begin taking the medication(s) as directed    If the emergency physician has read X-rays, these will be re-interpreted by a radiologist.  If there is a significant

## (undated) NOTE — IP AVS SNAPSHOT
[facility] [place] [address] [place], [address] ~ [phone]                Discharge Summary   2/28/2017    Annabelle Camp           Admission Information        Provider Department    2/28/2017 Jaguar Pizano MD  [id number]         Jonny Se Fluticasone Propionate 50 MCG/ACT Susp   Commonly known as:  FLONASE        2 sprays by Each Nare route daily.     Rose Oquendo                           MetFORMIN HCl 500 MG Tabs   Commonly known as:  GLUCOPHAGE        Take 500 mg by mouth 2 (two) flakito Contact information:    [address] [address] [phone]        Future Appointments     May 22, 2017  9:30 AM   FOLLOW UP with Randa Yarbrough MD   [address] [facility] Regi Bills ([address] [place] [address])    Martín 4.2 (03/02/17)  101      Pending Labs     Order Current Status    BLOOD CULTURE Preliminary result    BLOOD CULTURE Preliminary result      Radiology Exams     None      Patient Belongings       Most Recent Value    All belongings returned to patient at [facility] Most common side effects:  Allergic reactions, rash, nausea, diarrhea   What to report to your healthcare team: Tolerance of medications, temperature, rash, itching, shortness of breath, chills, nausea, and diarrhea           Blood Sugar Medications     Met

## (undated) NOTE — ED AVS SNAPSHOT
Christofer Orr   MRN: [id number]    Department:  [facility] Emergency Department   Date of Visit:  2/11/2018           Disclosure     Insurance plans vary and the physician(s) referred by the ER may not be covered by your plan.  Please contact your tell this physician (or your personal doctor if your instructions are to return to your personal doctor) about any new or lasting problems. The primary care or specialist physician will see patients referred from the [facility] Emergency Department.  Andres Retana